# Patient Record
Sex: MALE | Race: WHITE | NOT HISPANIC OR LATINO
[De-identification: names, ages, dates, MRNs, and addresses within clinical notes are randomized per-mention and may not be internally consistent; named-entity substitution may affect disease eponyms.]

---

## 2021-04-01 ENCOUNTER — APPOINTMENT (OUTPATIENT)
Dept: OTOLARYNGOLOGY | Facility: CLINIC | Age: 71
End: 2021-04-01
Payer: MEDICARE

## 2021-04-01 VITALS
WEIGHT: 200 LBS | BODY MASS INDEX: 26.51 KG/M2 | HEIGHT: 73 IN | HEART RATE: 63 BPM | TEMPERATURE: 97.3 F | DIASTOLIC BLOOD PRESSURE: 72 MMHG | OXYGEN SATURATION: 98 % | SYSTOLIC BLOOD PRESSURE: 122 MMHG

## 2021-04-01 DIAGNOSIS — R49.0 DYSPHONIA: ICD-10-CM

## 2021-04-01 PROCEDURE — 31575 DIAGNOSTIC LARYNGOSCOPY: CPT

## 2021-04-01 PROCEDURE — 99204 OFFICE O/P NEW MOD 45 MIN: CPT | Mod: 25

## 2021-04-01 NOTE — HISTORY OF PRESENT ILLNESS
[None] : The patient is currently asymptomatic. [de-identified] : Mr. MELARA is a 70 year male who presents reports that he noticed that his voice raspy and increased phlegm since December, 2020.  He saw Dr. Rg Fry who did a stroboscopy and was found to have a lesion on the left TVF.  He reports that his voice is still relatively the same since December.  Denies any pain, discomfort, shortness of breath, or dysphagia.  Patient is a non-smoker and no sudden weight loss. [Neck Mass] : no neck mass [Difficulty Swallowing] : no difficulty swallowing [Painful Swallowing] : no painful swallowing

## 2021-04-01 NOTE — CONSULT LETTER
[Dear  ___] : Dear  [unfilled], [Consult Letter:] : I had the pleasure of evaluating your patient, [unfilled]. [Please see my note below.] : Please see my note below. [Consult Closing:] : Thank you very much for allowing me to participate in the care of this patient.  If you have any questions, please do not hesitate to contact me. [Sincerely,] : Sincerely, [FreeTextEntry2] : Rg Fry MD ([place], [place]) [FreeTextEntry3] : Ant Denson MD, FACS\par Chief of Otolaryngology [facility]\par  - Dept. of Otolaryngology\par [employer] [employer]\par \par

## 2021-04-01 NOTE — PROCEDURE
[Unable to Cooperate with Mirror] : patient unable to cooperate with mirror [Lesion] : lesion identified by mirror examination needing further evaluation [Topical Lidocaine] : topical lidocaine [Oxymetazoline HCl] : oxymetazoline HCl [Flexible Endoscope] : examined with the flexible endoscope [Serial Number: ___] : Serial Number: [unfilled] [True Vocal Cords Paralysis] : no true vocal cord paralysis [True Vocal Cords Erythematous] : no true vocal cord edema [True Vocal Cords Garces's Nodules] : no true vocal cord nodules [Glottis Arytenoid Cartilages] : no arytenoid granulomas [Glottis Arytenoid Cartilages Erythema] : no arytenoid erythema [Normal] : posterior cricoid area had healthy pink mucosa in the interarytenoid area and the esophageal inlet

## 2021-04-01 NOTE — PHYSICAL EXAM
[FreeTextEntry1] : Raspy voice [Midline] : trachea located in midline position [Laryngoscopy Performed] : laryngoscopy was performed, see procedure section for findings [Normal] : no rashes

## 2021-05-05 ENCOUNTER — OUTPATIENT (OUTPATIENT)
Dept: OUTPATIENT SERVICES | Facility: HOSPITAL | Age: 71
LOS: 1 days | End: 2021-05-05
Payer: MEDICARE

## 2021-05-05 VITALS
SYSTOLIC BLOOD PRESSURE: 133 MMHG | HEIGHT: 72 IN | HEART RATE: 69 BPM | OXYGEN SATURATION: 99 % | RESPIRATION RATE: 16 BRPM | WEIGHT: 207.23 LBS | DIASTOLIC BLOOD PRESSURE: 81 MMHG | TEMPERATURE: 97 F

## 2021-05-05 DIAGNOSIS — J38.3 OTHER DISEASES OF VOCAL CORDS: ICD-10-CM

## 2021-05-05 DIAGNOSIS — Z01.818 ENCOUNTER FOR OTHER PREPROCEDURAL EXAMINATION: ICD-10-CM

## 2021-05-05 LAB
ANION GAP SERPL CALC-SCNC: 11 MMOL/L — SIGNIFICANT CHANGE UP (ref 5–17)
BUN SERPL-MCNC: 19 MG/DL — SIGNIFICANT CHANGE UP (ref 7–23)
CALCIUM SERPL-MCNC: 8.5 MG/DL — SIGNIFICANT CHANGE UP (ref 8.4–10.5)
CHLORIDE SERPL-SCNC: 104 MMOL/L — SIGNIFICANT CHANGE UP (ref 96–108)
CO2 SERPL-SCNC: 25 MMOL/L — SIGNIFICANT CHANGE UP (ref 22–31)
CREAT SERPL-MCNC: 0.89 MG/DL — SIGNIFICANT CHANGE UP (ref 0.5–1.3)
GLUCOSE SERPL-MCNC: 89 MG/DL — SIGNIFICANT CHANGE UP (ref 70–99)
HCT VFR BLD CALC: 47 % — SIGNIFICANT CHANGE UP (ref 39–50)
HGB BLD-MCNC: 16.1 G/DL — SIGNIFICANT CHANGE UP (ref 13–17)
MCHC RBC-ENTMCNC: 30.5 PG — SIGNIFICANT CHANGE UP (ref 27–34)
MCHC RBC-ENTMCNC: 34.3 GM/DL — SIGNIFICANT CHANGE UP (ref 32–36)
MCV RBC AUTO: 89 FL — SIGNIFICANT CHANGE UP (ref 80–100)
NRBC # BLD: 0 /100 WBCS — SIGNIFICANT CHANGE UP (ref 0–0)
PLATELET # BLD AUTO: 179 K/UL — SIGNIFICANT CHANGE UP (ref 150–400)
POTASSIUM SERPL-MCNC: 4.5 MMOL/L — SIGNIFICANT CHANGE UP (ref 3.5–5.3)
POTASSIUM SERPL-SCNC: 4.5 MMOL/L — SIGNIFICANT CHANGE UP (ref 3.5–5.3)
RBC # BLD: 5.28 M/UL — SIGNIFICANT CHANGE UP (ref 4.2–5.8)
RBC # FLD: 12.1 % — SIGNIFICANT CHANGE UP (ref 10.3–14.5)
SODIUM SERPL-SCNC: 140 MMOL/L — SIGNIFICANT CHANGE UP (ref 135–145)
WBC # BLD: 6.04 K/UL — SIGNIFICANT CHANGE UP (ref 3.8–10.5)
WBC # FLD AUTO: 6.04 K/UL — SIGNIFICANT CHANGE UP (ref 3.8–10.5)

## 2021-05-05 PROCEDURE — 85027 COMPLETE CBC AUTOMATED: CPT

## 2021-05-05 PROCEDURE — 93005 ELECTROCARDIOGRAM TRACING: CPT

## 2021-05-05 PROCEDURE — 80048 BASIC METABOLIC PNL TOTAL CA: CPT

## 2021-05-05 PROCEDURE — 36415 COLL VENOUS BLD VENIPUNCTURE: CPT

## 2021-05-05 PROCEDURE — G0463: CPT

## 2021-05-05 PROCEDURE — 93010 ELECTROCARDIOGRAM REPORT: CPT | Mod: NC

## 2021-05-05 RX ORDER — SODIUM CHLORIDE 9 MG/ML
1000 INJECTION, SOLUTION INTRAVENOUS
Refills: 0 | Status: DISCONTINUED | OUTPATIENT
Start: 2021-05-19 | End: 2021-06-03

## 2021-05-05 NOTE — H&P PST ADULT - NSANTHOSAYNRD_GEN_A_CORE
neck 17.5inches/No. ABHIJEET screening performed.  STOP BANG Legend: 0-2 = LOW Risk; 3-4 = INTERMEDIATE Risk; 5-8 = HIGH Risk

## 2021-05-05 NOTE — H&P PST ADULT - NSICDXPROBLEM_GEN_ALL_CORE_FT
PROBLEM DIAGNOSES  Problem: Other diseases of vocal cords  Assessment and Plan: Pre-op instructions given. Pt verbalized understanding  Pending: M/C + Covidprc test/results

## 2021-05-05 NOTE — H&P PST ADULT - HISTORY OF PRESENT ILLNESS
69yo male with medical h/o Vocal cord polyp and presents today for PST for Microlaryngoscopy w/Excision of Vocal Cord Lesion scheduled for 5/19/2021

## 2021-05-16 ENCOUNTER — APPOINTMENT (OUTPATIENT)
Dept: DISASTER EMERGENCY | Facility: CLINIC | Age: 71
End: 2021-05-16

## 2021-05-16 DIAGNOSIS — Z01.818 ENCOUNTER FOR OTHER PREPROCEDURAL EXAMINATION: ICD-10-CM

## 2021-05-16 LAB — SARS-COV-2 N GENE NPH QL NAA+PROBE: NOT DETECTED

## 2021-05-18 ENCOUNTER — TRANSCRIPTION ENCOUNTER (OUTPATIENT)
Age: 71
End: 2021-05-18

## 2021-05-18 PROBLEM — J38.3 OTHER DISEASES OF VOCAL CORDS: Chronic | Status: ACTIVE | Noted: 2021-05-05

## 2021-05-19 ENCOUNTER — RESULT REVIEW (OUTPATIENT)
Age: 71
End: 2021-05-19

## 2021-05-19 ENCOUNTER — APPOINTMENT (OUTPATIENT)
Dept: OTOLARYNGOLOGY | Facility: HOSPITAL | Age: 71
End: 2021-05-19

## 2021-05-19 ENCOUNTER — OUTPATIENT (OUTPATIENT)
Dept: OUTPATIENT SERVICES | Facility: HOSPITAL | Age: 71
LOS: 1 days | End: 2021-05-19
Payer: MEDICARE

## 2021-05-19 VITALS
SYSTOLIC BLOOD PRESSURE: 121 MMHG | HEIGHT: 72 IN | RESPIRATION RATE: 16 BRPM | WEIGHT: 207.23 LBS | TEMPERATURE: 98 F | DIASTOLIC BLOOD PRESSURE: 60 MMHG | HEART RATE: 57 BPM | OXYGEN SATURATION: 98 %

## 2021-05-19 VITALS
HEART RATE: 63 BPM | DIASTOLIC BLOOD PRESSURE: 66 MMHG | TEMPERATURE: 97 F | OXYGEN SATURATION: 99 % | SYSTOLIC BLOOD PRESSURE: 115 MMHG | RESPIRATION RATE: 16 BRPM

## 2021-05-19 DIAGNOSIS — J38.3 OTHER DISEASES OF VOCAL CORDS: ICD-10-CM

## 2021-05-19 DIAGNOSIS — Z98.42 CATARACT EXTRACTION STATUS, LEFT EYE: Chronic | ICD-10-CM

## 2021-05-19 DIAGNOSIS — Z98.890 OTHER SPECIFIED POSTPROCEDURAL STATES: Chronic | ICD-10-CM

## 2021-05-19 DIAGNOSIS — Z98.49 CATARACT EXTRACTION STATUS, UNSPECIFIED EYE: Chronic | ICD-10-CM

## 2021-05-19 PROCEDURE — 88305 TISSUE EXAM BY PATHOLOGIST: CPT | Mod: 26

## 2021-05-19 PROCEDURE — 88305 TISSUE EXAM BY PATHOLOGIST: CPT

## 2021-05-19 PROCEDURE — 31541 LARYNSCOP W/TUMR EXC + SCOPE: CPT | Mod: LT

## 2021-05-19 PROCEDURE — 31541 LARYNSCOP W/TUMR EXC + SCOPE: CPT

## 2021-05-19 RX ORDER — HYDROMORPHONE HYDROCHLORIDE 2 MG/ML
0.5 INJECTION INTRAMUSCULAR; INTRAVENOUS; SUBCUTANEOUS
Refills: 0 | Status: DISCONTINUED | OUTPATIENT
Start: 2021-05-19 | End: 2021-05-19

## 2021-05-19 RX ORDER — SODIUM CHLORIDE 9 MG/ML
1000 INJECTION, SOLUTION INTRAVENOUS
Refills: 0 | Status: DISCONTINUED | OUTPATIENT
Start: 2021-05-19 | End: 2021-05-19

## 2021-05-19 RX ORDER — HYDROMORPHONE HYDROCHLORIDE 2 MG/ML
1 INJECTION INTRAMUSCULAR; INTRAVENOUS; SUBCUTANEOUS
Refills: 0 | Status: DISCONTINUED | OUTPATIENT
Start: 2021-05-19 | End: 2021-05-19

## 2021-05-19 RX ORDER — ONDANSETRON 8 MG/1
4 TABLET, FILM COATED ORAL ONCE
Refills: 0 | Status: DISCONTINUED | OUTPATIENT
Start: 2021-05-19 | End: 2021-05-19

## 2021-05-19 RX ADMIN — SODIUM CHLORIDE 50 MILLILITER(S): 9 INJECTION, SOLUTION INTRAVENOUS at 13:30

## 2021-05-19 NOTE — ASU PATIENT PROFILE, ADULT - PSH
H/O cataract extraction  right eye  History of left cataract extraction    S/P right knee arthroscopy

## 2021-05-19 NOTE — ASU DISCHARGE PLAN (ADULT/PEDIATRIC) - CARE PROVIDER_API CALL
Ant Denson)  Otolaryngology  [address] [place] [address]  Phone: [phone]  Fax: [phone]  Established Patient  Follow Up Time: 1 week

## 2021-05-19 NOTE — ASU DISCHARGE PLAN (ADULT/PEDIATRIC) - NURSING INSTRUCTIONS
increase fluid intake. all discharge instructions discussed with patient and all questions answered. pt verbalized agreement with discharge teaching and follow up instructions

## 2021-05-21 PROBLEM — K64.9 UNSPECIFIED HEMORRHOIDS: Chronic | Status: ACTIVE | Noted: 2021-05-19

## 2021-05-21 PROBLEM — E78.5 HYPERLIPIDEMIA, UNSPECIFIED: Chronic | Status: ACTIVE | Noted: 2021-05-19

## 2021-05-21 PROBLEM — K63.5 POLYP OF COLON: Chronic | Status: ACTIVE | Noted: 2021-05-19

## 2021-05-21 PROBLEM — D68.2 HEREDITARY DEFICIENCY OF OTHER CLOTTING FACTORS: Chronic | Status: ACTIVE | Noted: 2021-05-19

## 2021-05-27 ENCOUNTER — APPOINTMENT (OUTPATIENT)
Dept: OTOLARYNGOLOGY | Facility: CLINIC | Age: 71
End: 2021-05-27
Payer: MEDICARE

## 2021-05-27 VITALS
DIASTOLIC BLOOD PRESSURE: 70 MMHG | WEIGHT: 207 LBS | BODY MASS INDEX: 27.43 KG/M2 | HEIGHT: 73 IN | TEMPERATURE: 97 F | SYSTOLIC BLOOD PRESSURE: 126 MMHG

## 2021-05-27 DIAGNOSIS — Z78.9 OTHER SPECIFIED HEALTH STATUS: ICD-10-CM

## 2021-05-27 PROCEDURE — 99213 OFFICE O/P EST LOW 20 MIN: CPT

## 2021-05-27 NOTE — CONSULT LETTER
[Dear  ___] : Dear  [unfilled], [Courtesy Letter:] : I had the pleasure of seeing your patient, [unfilled], in my office today. [Please see my note below.] : Please see my note below. [Consult Closing:] : Thank you very much for allowing me to participate in the care of this patient.  If you have any questions, please do not hesitate to contact me. [Sincerely,] : Sincerely, [FreeTextEntry2] : Rg Fry MD ([place], [place])\par  [FreeTextEntry3] : Ant Denson MD, FACS\par Chief of Otolaryngology [facility]\par  - Dept. of Otolaryngology\par [facility] [employer]\par \par  [DrKike  ___] : Dr. FERNANDO

## 2021-05-27 NOTE — ASSESSMENT
[FreeTextEntry1] : Pt appears to have a T1 anterior VC lesion.  The excisional biopsy was not an oncologic procedure.  He will need additional treatment.  Given the location on the vocal cord, surgery would result in a worse voice outcome.  Pt to be referred for radiation therapy.

## 2021-05-27 NOTE — CONSULT LETTER
[Dear  ___] : Dear  [unfilled], [Courtesy Letter:] : I had the pleasure of seeing your patient, [unfilled], in my office today. [Please see my note below.] : Please see my note below. [Consult Closing:] : Thank you very much for allowing me to participate in the care of this patient.  If you have any questions, please do not hesitate to contact me. [Sincerely,] : Sincerely, [FreeTextEntry2] : Rg Fry MD ([place], [place])\par  [FreeTextEntry3] : Ant Denson MD, FACS\par Chief of Otolaryngology [facility]\par  - Dept. of Otolaryngology\par [facility] [employer]\par \par  [DrKike  ___] : Dr. FERNANDO

## 2021-05-27 NOTE — REASON FOR VISIT
[Subsequent Evaluation] : a subsequent evaluation for [FreeTextEntry2] : raspy voice s/p direct laryngoscopy and biopsy

## 2021-05-27 NOTE — DATA REVIEWED
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  It  consists of\par multiple fragments of soft, tan tissue ranging from less than 0.1\par cm to 0.4 cm in greatest dimension and aggregating to 0.6 x 0.6 x\par 0.2 cm.  Entirely submitted.  One cassette.\par \par In addition to other data that may appear on the specimen\par container, the label has been inspected to confirm the presence\par of the patient's name and date of birth.\par Grace BARROW 05/20/2021 13:27\par \par Disclaimer\par Histological Processing Performed at [facility], Department of Pathology, [address] [place] [address].\par \par \par Surgical Consult Report\par \par \par \par \par Disclaimer\par Histological Processing Performed at [facility], Department of Pathology, [address] [place] [address] [place].\par \par  \par \par  Ordered by: SHELLEY SANCHEZ       Collected/Examined: 19May2021 07:14AM       \par Verified by: SHELLEY SANCHEZ 27May2021 09:29AM       \par  Result Communication: No patient communication needed at this time;\par Stage: Final       \par  Performed at: [facility]       Resulted: 21May2021 01:24PM       Last Updated: 27May2021 09:29AM       Accession: [id number]

## 2021-05-27 NOTE — HISTORY OF PRESENT ILLNESS
[None] : The patient is currently asymptomatic. [de-identified] : Mr. MELARA is a 70 year male who presents reports that he noticed that his voice raspy and increased phlegm since December, 2020.  He saw Dr. Rg Fry who did a stroboscopy and was found to have a lesion on the left TVF.  He reports that his voice is still relatively the same since December.  Denies any pain, discomfort, shortness of breath, or dysphagia.  Patient is a non-smoker and no sudden weight loss. [FreeTextEntry1] : Mr. Salas is over 1 week post direct laryngoscopy and biopsy of a vocal cord lesion.  Final pathology is consistent with invasive SCCa, well to moderately differentiated.  He reports that his voice has improved since the procedure. [Neck Mass] : no neck mass [Difficulty Swallowing] : no difficulty swallowing [Painful Swallowing] : no painful swallowing

## 2021-05-27 NOTE — HISTORY OF PRESENT ILLNESS
[None] : The patient is currently asymptomatic. [de-identified] : Mr. MELARA is a 70 year male who presents reports that he noticed that his voice raspy and increased phlegm since December, 2020.  He saw Dr. Rg Fry who did a stroboscopy and was found to have a lesion on the left TVF.  He reports that his voice is still relatively the same since December.  Denies any pain, discomfort, shortness of breath, or dysphagia.  Patient is a non-smoker and no sudden weight loss. [FreeTextEntry1] : Mr. Salas is over 1 week post direct laryngoscopy and biopsy of a vocal cord lesion.  Final pathology is consistent with invasive SCCa, well to moderately differentiated.  He reports that his voice has improved since the procedure. [Neck Mass] : no neck mass [Difficulty Swallowing] : no difficulty swallowing [Painful Swallowing] : no painful swallowing

## 2021-05-27 NOTE — DATA REVIEWED
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  It  consists of\par multiple fragments of soft, tan tissue ranging from less than 0.1\par cm to 0.4 cm in greatest dimension and aggregating to 0.6 x 0.6 x\par 0.2 cm.  Entirely submitted.  One cassette.\par \par In addition to other data that may appear on the specimen\par container, the label has been inspected to confirm the presence\par of the patient's name and date of birth.\par Grace BARROW 05/20/2021 13:27\par \par Disclaimer\par Histological Processing Performed at [facility], Department of Pathology, [address] [place] [address].\par \par \par Surgical Consult Report\par \par \par \par \par Disclaimer\par Histological Processing Performed at [facility], Department of Pathology, [address] [place] [address] [place].\par \par  \par \par  Ordered by: SHELLEY SANCHEZ       Collected/Examined: 19May2021 07:14AM       \par Verified by: SHELLEY SANCHEZ 27May2021 09:29AM       \par  Result Communication: No patient communication needed at this time;\par Stage: Final       \par  Performed at: [facility]       Resulted: 21May2021 01:24PM       Last Updated: 27May2021 09:29AM       Accession: [id number]

## 2021-06-08 ENCOUNTER — APPOINTMENT (OUTPATIENT)
Dept: RADIATION ONCOLOGY | Facility: CLINIC | Age: 71
End: 2021-06-08
Payer: MEDICARE

## 2021-06-08 ENCOUNTER — RESULT REVIEW (OUTPATIENT)
Age: 71
End: 2021-06-08

## 2021-06-08 ENCOUNTER — OUTPATIENT (OUTPATIENT)
Dept: OUTPATIENT SERVICES | Facility: HOSPITAL | Age: 71
LOS: 1 days | Discharge: HOME | End: 2021-06-08
Payer: MEDICARE

## 2021-06-08 VITALS
SYSTOLIC BLOOD PRESSURE: 128 MMHG | BODY MASS INDEX: 27.21 KG/M2 | TEMPERATURE: 97.2 F | HEART RATE: 66 BPM | DIASTOLIC BLOOD PRESSURE: 79 MMHG | RESPIRATION RATE: 18 BRPM | OXYGEN SATURATION: 99 % | WEIGHT: 206.24 LBS

## 2021-06-08 DIAGNOSIS — Z98.49 CATARACT EXTRACTION STATUS, UNSPECIFIED EYE: Chronic | ICD-10-CM

## 2021-06-08 DIAGNOSIS — Z98.42 CATARACT EXTRACTION STATUS, LEFT EYE: Chronic | ICD-10-CM

## 2021-06-08 DIAGNOSIS — Z98.890 OTHER SPECIFIED POSTPROCEDURAL STATES: Chronic | ICD-10-CM

## 2021-06-08 PROCEDURE — 31575 DIAGNOSTIC LARYNGOSCOPY: CPT

## 2021-06-08 PROCEDURE — 99204 OFFICE O/P NEW MOD 45 MIN: CPT | Mod: 25

## 2021-06-08 PROCEDURE — 77263 THER RADIOLOGY TX PLNG CPLX: CPT

## 2021-06-09 ENCOUNTER — NON-APPOINTMENT (OUTPATIENT)
Age: 71
End: 2021-06-09

## 2021-06-10 ENCOUNTER — APPOINTMENT (OUTPATIENT)
Dept: CT IMAGING | Facility: IMAGING CENTER | Age: 71
End: 2021-06-10
Payer: MEDICARE

## 2021-06-10 ENCOUNTER — OUTPATIENT (OUTPATIENT)
Dept: OUTPATIENT SERVICES | Facility: HOSPITAL | Age: 71
LOS: 1 days | End: 2021-06-10
Payer: MEDICARE

## 2021-06-10 DIAGNOSIS — Z98.890 OTHER SPECIFIED POSTPROCEDURAL STATES: Chronic | ICD-10-CM

## 2021-06-10 DIAGNOSIS — Z98.49 CATARACT EXTRACTION STATUS, UNSPECIFIED EYE: Chronic | ICD-10-CM

## 2021-06-10 DIAGNOSIS — C32.0 MALIGNANT NEOPLASM OF GLOTTIS: ICD-10-CM

## 2021-06-10 DIAGNOSIS — Z98.42 CATARACT EXTRACTION STATUS, LEFT EYE: Chronic | ICD-10-CM

## 2021-06-10 PROCEDURE — 70491 CT SOFT TISSUE NECK W/DYE: CPT

## 2021-06-10 PROCEDURE — 70491 CT SOFT TISSUE NECK W/DYE: CPT | Mod: 26,MH

## 2021-06-10 PROCEDURE — 71260 CT THORAX DX C+: CPT | Mod: 26,MH

## 2021-06-10 PROCEDURE — 71260 CT THORAX DX C+: CPT

## 2021-06-10 NOTE — PHYSICAL EXAM
[Normal] : oriented to person, place and time, the affect was normal, the mood was normal and not anxious [FreeTextEntry1] : Oral cavity, no mass. free mobile tongue. no cervical lymphadenopathy

## 2021-06-10 NOTE — PROCEDURE
[Hoarseness] : hoarseness not clearly evaluated by indirect laryngoscopy [Lesion] : lesion identified by mirror examination needing further evaluation [Topical Lidocaine] : topical lidocaine [Normal] : normal pharyngeal walls [Abnormal] : the true vocal cords ~T were abnormal [de-identified] :  left cord lesion  , free mobile cords

## 2021-06-10 NOTE — HISTORY OF PRESENT ILLNESS
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 \par He noticed that his voice was raspy and he had increased phlegm since December, 2020. He saw Dr. Rg Fry who did a stroboscopy and was found to have a lesion on the left TVF.\par 5/19/2021 He had direct laryngoscopy and biopsy of a vocal cord lesion with Dr. Denson. Final pathology consistent with invasive squamous cell carcinoma well to moderately differentiated. \par \par Presents for consultation to discuss treatment with radiation therapy. Patient denies hoarseness, difficulty with swallowing, pain, and coughing. He report clear phlegm in the morning that clears up during the day, and occasional dry mouth.

## 2021-06-10 NOTE — REVIEW OF SYSTEMS
[Negative] : Endocrine [Fatigue: Grade 0] : Fatigue: Grade 0 [Xerostomia: Grade 0] : Xerostomia: Grade 0 [Oral Pain: Grade 0] : Oral Pain: Grade 0 [Salivary duct inflammation: Grade 0] : Salivary duct inflammation: Grade 0 [Dysgeusia: Grade 0] : Dysgeusia: Grade 0 [Dysphagia] : no dysphagia [Hoarseness] : no hoarseness [Mucosal Pain] : no mucosal pain

## 2021-06-16 PROCEDURE — 77300 RADIATION THERAPY DOSE PLAN: CPT | Mod: 26

## 2021-06-16 PROCEDURE — 77301 RADIOTHERAPY DOSE PLAN IMRT: CPT | Mod: 26

## 2021-06-16 PROCEDURE — 77338 DESIGN MLC DEVICE FOR IMRT: CPT | Mod: 26

## 2021-06-24 PROCEDURE — 77427 RADIATION TX MANAGEMENT X5: CPT

## 2021-06-24 PROCEDURE — 77387B: CUSTOM | Mod: 26

## 2021-06-25 PROCEDURE — 77387B: CUSTOM | Mod: 26

## 2021-06-29 PROCEDURE — 77387B: CUSTOM | Mod: 26

## 2021-06-30 ENCOUNTER — NON-APPOINTMENT (OUTPATIENT)
Age: 71
End: 2021-06-30

## 2021-06-30 VITALS
RESPIRATION RATE: 16 BRPM | HEART RATE: 62 BPM | WEIGHT: 207.68 LBS | DIASTOLIC BLOOD PRESSURE: 84 MMHG | OXYGEN SATURATION: 99 % | SYSTOLIC BLOOD PRESSURE: 123 MMHG | BODY MASS INDEX: 27.4 KG/M2

## 2021-06-30 PROCEDURE — 77387B: CUSTOM | Mod: 26

## 2021-06-30 NOTE — HISTORY OF PRESENT ILLNESS
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 \par He noticed that his voice was raspy and he had increased phlegm since December, 2020. He saw Dr. Rg Fry who did a stroboscopy and was found to have a lesion on the left TVF.\par 5/19/2021 He had direct laryngoscopy and biopsy of a vocal cord lesion with Dr. Denson. Final pathology consistent with invasive squamous cell carcinoma well to moderately differentiated. \par \par He presents for post treatment evaluation. Completed 4/28 fractions to Larynx. Tolerating treatment well. Denies pain, difficulty swallowing, states eating with no problem. Skin and mouth care reviewed.

## 2021-06-30 NOTE — DISEASE MANAGEMENT
[Clinical] : TNM Stage: c [I] : I [TTNM] : 1 [NTNM] : 0 [MTNM] : 0 [de-identified] : 900 [[place] [address]] : 7389 [de-identified] : Larynx

## 2021-07-01 PROCEDURE — 77014: CPT | Mod: 26

## 2021-07-02 PROCEDURE — 77387B: CUSTOM | Mod: 26

## 2021-07-02 PROCEDURE — 77427 RADIATION TX MANAGEMENT X5: CPT

## 2021-07-06 PROCEDURE — 77387B: CUSTOM | Mod: 26

## 2021-07-07 ENCOUNTER — NON-APPOINTMENT (OUTPATIENT)
Age: 71
End: 2021-07-07

## 2021-07-07 VITALS
RESPIRATION RATE: 16 BRPM | WEIGHT: 208.11 LBS | SYSTOLIC BLOOD PRESSURE: 123 MMHG | OXYGEN SATURATION: 97 % | HEART RATE: 67 BPM | BODY MASS INDEX: 27.46 KG/M2 | DIASTOLIC BLOOD PRESSURE: 77 MMHG

## 2021-07-07 PROCEDURE — 77387B: CUSTOM | Mod: 26

## 2021-07-07 NOTE — DISEASE MANAGEMENT
[Clinical] : TNM Stage: c [I] : I [TTNM] : 1 [NTNM] : 0 [MTNM] : 0 [de-identified] : 7550 [[place] [address]] : 4394 [de-identified] : Larynx

## 2021-07-07 NOTE — HISTORY OF PRESENT ILLNESS
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 \par He noticed that his voice was raspy and he had increased phlegm since December, 2020. He saw Dr. Rg Fry who did a stroboscopy and was found to have a lesion on the left TVF.\par 5/19/2021 He had direct laryngoscopy and biopsy of a vocal cord lesion with Dr. Denson. Final pathology consistent with invasive squamous cell carcinoma well to moderately differentiated. \par \par He presents for post treatment evaluation. Completed 8/28 fractions to Larynx. Tolerating treatment well. Denies pain, difficulty swallowing, states eating with no problem. Skin and mouth care reviewed.

## 2021-07-07 NOTE — REVIEW OF SYSTEMS
[Dysphagia: Grade 0] : Dysphagia: Grade 0 [Mucositis Oral: Grade 0] : Mucositis Oral: Grade 0  [Xerostomia: Grade 0] : Xerostomia: Grade 0 [Oral Pain: Grade 0] : Oral Pain: Grade 0 [Dysgeusia: Grade 0] : Dysgeusia: Grade 0 [Skin Hyperpigmentation: Grade 0] : Skin Hyperpigmentation: Grade 0 [Dermatitis Radiation: Grade 0] : Dermatitis Radiation: Grade 0 [Fatigue: Grade 1 - Fatigue relieved by rest] : Fatigue: Grade 1 - Fatigue relieved by rest

## 2021-07-08 PROCEDURE — 77014: CPT | Mod: 26

## 2021-07-09 PROCEDURE — 77387B: CUSTOM | Mod: 26

## 2021-07-12 ENCOUNTER — NON-APPOINTMENT (OUTPATIENT)
Age: 71
End: 2021-07-12

## 2021-07-12 VITALS
WEIGHT: 206.24 LBS | OXYGEN SATURATION: 97 % | BODY MASS INDEX: 27.21 KG/M2 | RESPIRATION RATE: 16 BRPM | SYSTOLIC BLOOD PRESSURE: 118 MMHG | HEART RATE: 70 BPM | DIASTOLIC BLOOD PRESSURE: 74 MMHG

## 2021-07-12 PROCEDURE — 77387B: CUSTOM | Mod: 26

## 2021-07-12 PROCEDURE — 77427 RADIATION TX MANAGEMENT X5: CPT

## 2021-07-12 NOTE — VITALS
[Maximal Pain Intensity: 8/10] : 8/10 [Least Pain Intensity: 0/10] : 0/10 [Pain Description/Quality: ___] : Pain description/quality: [unfilled] [Pain Duration: ___] : Pain duration: [unfilled] [Pain Location: ___] : Pain Location: [unfilled] [Pain Interferes with ADLs] : Pain interferes with activities of daily living. [90: Able to carry normal activity; minor signs or symptoms of disease.] : 90: Able to carry normal activity; minor signs or symptoms of disease.  [ECOG Performance Status: 1 - Restricted in physically strenuous activity but ambulatory and able to carry out work of a light or sedentary nature] : Performance Status: 1 - Restricted in physically strenuous activity but ambulatory and able to carry out work of a light or sedentary nature, e.g., light house work, office work

## 2021-07-12 NOTE — DISEASE MANAGEMENT
[Clinical] : TNM Stage: c [I] : I [TTNM] : 1 [NTNM] : 0 [MTNM] : 0 [de-identified] : 7927 [[place] [address]] : 5969 [de-identified] : Larynx

## 2021-07-12 NOTE — REVIEW OF SYSTEMS
[Dysphagia: Grade 1 - Symptomatic, able to eat regular diet] : Dysphagia: Grade 1 - Symptomatic, able to eat regular diet [Fatigue: Grade 1 - Fatigue relieved by rest] : Fatigue: Grade 1 - Fatigue relieved by rest [Mucositis Oral: Grade 0] : Mucositis Oral: Grade 0  [Xerostomia: Grade 1 - Symptomatic (e.g., dry or thick saliva) without significant dietary alteration; unstimulated saliva flow >0.2 ml/min] : Xerostomia: Grade 1 - Symptomatic (e.g., dry or thick saliva) without significant dietary alteration; unstimulated saliva flow >0.2 ml/min [Oral Pain: Grade 0] : Oral Pain: Grade 0 [Dysgeusia: Grade 0] : Dysgeusia: Grade 0 [Skin Hyperpigmentation: Grade 1 - Hyperpigmentation covering <10% BSA; no psychosocial impact] : Skin Hyperpigmentation: Grade 1 - Hyperpigmentation covering <10% BSA; no psychosocial impact [Dermatitis Radiation: Grade 1 - Faint erythema or dry desquamation] : Dermatitis Radiation: Grade 1 - Faint erythema or dry desquamation

## 2021-07-12 NOTE — HISTORY OF PRESENT ILLNESS
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 \par He noticed that his voice was raspy and he had increased phlegm since December, 2020. He saw Dr. Rg Fry who did a stroboscopy and was found to have a lesion on the left TVF.\par 5/19/2021 He had direct laryngoscopy and biopsy of a vocal cord lesion with Dr. Denson. Final pathology consistent with invasive squamous cell carcinoma well to moderately differentiated. \par \par He presents for post treatment evaluation. Completed 11/28 fractions to Larynx. Starting to have pain to throat and  difficulty swallowing, 2 lb weight loss noted. Magic mouthwash e-scribed.

## 2021-07-13 PROCEDURE — 77387B: CUSTOM | Mod: 26

## 2021-07-14 PROCEDURE — 77387B: CUSTOM | Mod: 26

## 2021-07-15 PROCEDURE — 77014: CPT | Mod: 26

## 2021-07-16 PROCEDURE — 77387B: CUSTOM | Mod: 26

## 2021-07-19 PROCEDURE — 77427 RADIATION TX MANAGEMENT X5: CPT

## 2021-07-19 PROCEDURE — 77387B: CUSTOM | Mod: 26

## 2021-07-20 PROCEDURE — 77387B: CUSTOM | Mod: 26

## 2021-07-21 VITALS
SYSTOLIC BLOOD PRESSURE: 125 MMHG | TEMPERATURE: 97 F | WEIGHT: 205.36 LBS | BODY MASS INDEX: 27.09 KG/M2 | OXYGEN SATURATION: 96 % | RESPIRATION RATE: 17 BRPM | HEART RATE: 70 BPM | DIASTOLIC BLOOD PRESSURE: 78 MMHG

## 2021-07-21 PROCEDURE — 77387B: CUSTOM | Mod: 26

## 2021-07-21 NOTE — DISEASE MANAGEMENT
[Clinical] : TNM Stage: c [I] : I [TTNM] : 1 [NTNM] : 0 [MTNM] : 0 [de-identified] : 8003 [[place] [address]] : 9983 [de-identified] : Larynx

## 2021-07-21 NOTE — HISTORY OF PRESENT ILLNESS
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 \par He noticed that his voice was raspy and he had increased phlegm since December, 2020. He saw Dr. Rg Fry who did a stroboscopy and was found to have a lesion on the left TVF.\par 5/19/2021 He had direct laryngoscopy and biopsy of a vocal cord lesion with Dr. Denson. Final pathology consistent with invasive squamous cell carcinoma well to moderately differentiated. \par \par He presents for post treatment evaluation. Completed 18/28 fractions to Larynx. Continues to have pain to throat and  difficulty swallowing,  1 weight loss noted. Using Magic mouthwash with some relief.

## 2021-07-22 PROCEDURE — 77014: CPT | Mod: 26

## 2021-07-23 PROCEDURE — 77387B: CUSTOM | Mod: 26

## 2021-07-26 PROCEDURE — 77427 RADIATION TX MANAGEMENT X5: CPT

## 2021-07-26 PROCEDURE — 77387B: CUSTOM | Mod: 26

## 2021-07-27 PROCEDURE — 77387B: CUSTOM | Mod: 26

## 2021-07-28 ENCOUNTER — NON-APPOINTMENT (OUTPATIENT)
Age: 71
End: 2021-07-28

## 2021-07-28 VITALS
WEIGHT: 203.26 LBS | TEMPERATURE: 95.9 F | HEART RATE: 70 BPM | OXYGEN SATURATION: 97 % | SYSTOLIC BLOOD PRESSURE: 109 MMHG | DIASTOLIC BLOOD PRESSURE: 64 MMHG | RESPIRATION RATE: 17 BRPM | BODY MASS INDEX: 26.82 KG/M2

## 2021-07-28 PROCEDURE — 77387B: CUSTOM | Mod: 26

## 2021-07-28 NOTE — HISTORY OF PRESENT ILLNESS
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 \par He noticed that his voice was raspy and he had increased phlegm since December, 2020. He saw Dr. Rg Fry who did a stroboscopy and was found to have a lesion on the left TVF.\par 5/19/2021 He had direct laryngoscopy and biopsy of a vocal cord lesion with Dr. Denson. Final pathology consistent with invasive squamous cell carcinoma well to moderately differentiated. \par \par He presents for post treatment evaluation. Completed 23/28 fractions to Larynx. Continues to have pain to throat and  difficulty swallowing,  2 weight loss noted. Using Magic mouthwash with some relief.

## 2021-07-28 NOTE — DISEASE MANAGEMENT
[Clinical] : TNM Stage: c [I] : I [TTNM] : 1 [MTNM] : 0 [NTNM] : 0 [de-identified] : 1518 [[place] [address]] : 0156 [de-identified] : Larynx

## 2021-07-29 PROCEDURE — 77014: CPT | Mod: 26

## 2021-07-30 PROCEDURE — 77387B: CUSTOM | Mod: 26

## 2021-08-02 PROCEDURE — 77427 RADIATION TX MANAGEMENT X5: CPT

## 2021-08-02 PROCEDURE — 77387B: CUSTOM | Mod: 26

## 2021-08-03 PROCEDURE — 77387B: CUSTOM | Mod: 26

## 2021-08-04 VITALS
OXYGEN SATURATION: 97 % | BODY MASS INDEX: 26.67 KG/M2 | TEMPERATURE: 92.4 F | SYSTOLIC BLOOD PRESSURE: 110 MMHG | HEART RATE: 68 BPM | WEIGHT: 202.16 LBS | RESPIRATION RATE: 18 BRPM | DIASTOLIC BLOOD PRESSURE: 74 MMHG

## 2021-08-04 PROCEDURE — 77387B: CUSTOM | Mod: 26

## 2021-08-04 NOTE — REVIEW OF SYSTEMS
[Constipation: Grade 0] : Constipation: Grade 0 [Diarrhea: Grade 0] : Diarrhea: Grade 0 [Dysphagia: Grade 1 - Symptomatic, able to eat regular diet] : Dysphagia: Grade 1 - Symptomatic, able to eat regular diet [Nausea: Grade 0] : Nausea: Grade 0 [Vomiting: Grade 0] : Vomiting: Grade 0 [Fatigue: Grade 1 - Fatigue relieved by rest] : Fatigue: Grade 1 - Fatigue relieved by rest [Mucositis Oral: Grade 0] : Mucositis Oral: Grade 0  [Xerostomia: Grade 1 - Symptomatic (e.g., dry or thick saliva) without significant dietary alteration; unstimulated saliva flow >0.2 ml/min] : Xerostomia: Grade 1 - Symptomatic (e.g., dry or thick saliva) without significant dietary alteration; unstimulated saliva flow >0.2 ml/min [Oral Pain: Grade 0] : Oral Pain: Grade 0 [Dysgeusia: Grade 0] : Dysgeusia: Grade 0 [Skin Hyperpigmentation: Grade 1 - Hyperpigmentation covering <10% BSA; no psychosocial impact] : Skin Hyperpigmentation: Grade 1 - Hyperpigmentation covering <10% BSA; no psychosocial impact [Dermatitis Radiation: Grade 1 - Faint erythema or dry desquamation] : Dermatitis Radiation: Grade 1 - Faint erythema or dry desquamation

## 2021-08-04 NOTE — VITALS
[Maximal Pain Intensity: 8/10] : 8/10 [Least Pain Intensity: 5/10] : 5/10 [Pain Description/Quality: ___] : Pain description/quality: [unfilled] [Pain Duration: ___] : Pain duration: [unfilled] [Pain Location: ___] : Pain Location: [unfilled] [Pain Interferes with ADLs] : Pain interferes with activities of daily living. [NSAID/Non-Opioid] : NSAID/Non-Opioid [80: Normal activity with effort; some signs or symptoms of disease.] : 80: Normal activity with effort; some signs or symptoms of disease.  [ECOG Performance Status: 0 - Fully active, able to carry on all pre-disease performance without restriction] : Performance Status: 0 - Fully active, able to carry on all pre-disease performance without restriction

## 2021-08-04 NOTE — HISTORY OF PRESENT ILLNESS
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 \par He noticed that his voice was raspy and he had increased phlegm since December, 2020. He saw Dr. Rg Fry who did a stroboscopy and was found to have a lesion on the left TVF.\par 5/19/2021 He had direct laryngoscopy and biopsy of a vocal cord lesion with Dr. Denson. Final pathology consistent with invasive squamous cell carcinoma well to moderately differentiated. \par \par He presents for post treatment evaluation. Completed 23/28 fractions to Larynx. Continues to have pain to throat and  difficulty swallowing,  2 weight loss noted. Using Magic mouthwash with some relief. \par \par 8/4/21\par -Tolerated tx and completed today\par -Voice very hoarse and difficult to eat. Pain upon eating 8/10\par -mild erythema noted-using aquaphor to neck\par -Has lost some wt\par -completed tx today and given discharge packet.\par -To use carlos for next 2 weeks

## 2021-08-04 NOTE — DISEASE MANAGEMENT
[Clinical] : TNM Stage: c [TTNM] : 1 [NTNM] : 0 [MTNM] : 0 [I] : I [de-identified] : 28 tx/6300cGy [[place] [address]] : 3328 [de-identified] : Larynx

## 2021-08-30 ENCOUNTER — APPOINTMENT (OUTPATIENT)
Dept: OTOLARYNGOLOGY | Facility: CLINIC | Age: 71
End: 2021-08-30

## 2021-09-15 ENCOUNTER — APPOINTMENT (OUTPATIENT)
Dept: RADIATION ONCOLOGY | Facility: CLINIC | Age: 71
End: 2021-09-15
Payer: MEDICARE

## 2021-09-15 VITALS
TEMPERATURE: 98.5 F | WEIGHT: 207.45 LBS | OXYGEN SATURATION: 96 % | HEART RATE: 64 BPM | SYSTOLIC BLOOD PRESSURE: 133 MMHG | DIASTOLIC BLOOD PRESSURE: 77 MMHG | RESPIRATION RATE: 15 BRPM | BODY MASS INDEX: 27.49 KG/M2 | HEIGHT: 73 IN

## 2021-09-15 PROCEDURE — 99024 POSTOP FOLLOW-UP VISIT: CPT

## 2021-09-15 RX ORDER — DIPHENHYDRAMINE HYDROCHLORIDE AND LIDOCAINE HYDROCHLORIDE AND ALUMINUM HYDROXIDE AND MAGNESIUM HYDRO
KIT
Qty: 300 | Refills: 3 | Status: DISCONTINUED | COMMUNITY
Start: 2021-07-12 | End: 2021-09-15

## 2021-09-15 NOTE — REVIEW OF SYSTEMS
[Constipation: Grade 0] : Constipation: Grade 0 [Diarrhea: Grade 0] : Diarrhea: Grade 0 [Nausea: Grade 0] : Nausea: Grade 0 [Vomiting: Grade 0] : Vomiting: Grade 0 [Fatigue: Grade 1 - Fatigue relieved by rest] : Fatigue: Grade 1 - Fatigue relieved by rest [Mucositis Oral: Grade 0] : Mucositis Oral: Grade 0  [Oral Pain: Grade 0] : Oral Pain: Grade 0 [Xerostomia: Grade 1 - Symptomatic (e.g., dry or thick saliva) without significant dietary alteration; unstimulated saliva flow >0.2 ml/min] : Xerostomia: Grade 1 - Symptomatic (e.g., dry or thick saliva) without significant dietary alteration; unstimulated saliva flow >0.2 ml/min [Dysgeusia: Grade 0] : Dysgeusia: Grade 0 [Skin Hyperpigmentation: Grade 1 - Hyperpigmentation covering <10% BSA; no psychosocial impact] : Skin Hyperpigmentation: Grade 1 - Hyperpigmentation covering <10% BSA; no psychosocial impact [Dysphagia: Grade 0] : Dysphagia: Grade 0 [Dermatitis Radiation: Grade 0] : Dermatitis Radiation: Grade 0

## 2021-09-16 NOTE — DISEASE MANAGEMENT
[Clinical] : TNM Stage: c [I] : I [TTNM] : 1 [NTNM] : 0 [MTNM] : 0 [de-identified] : 28 tx/6300cGy [[place] [address]] : 4888 [de-identified] : Larynx

## 2021-09-16 NOTE — VITALS
[80: Normal activity with effort; some signs or symptoms of disease.] : 80: Normal activity with effort; some signs or symptoms of disease.  [ECOG Performance Status: 0 - Fully active, able to carry on all pre-disease performance without restriction] : Performance Status: 0 - Fully active, able to carry on all pre-disease performance without restriction [Maximal Pain Intensity: 0/10] : 0/10 [Least Pain Intensity: 0/10] : 0/10 [Pain Interferes with ADLs] : Pain does not interfere with activities of daily living

## 2021-09-27 ENCOUNTER — APPOINTMENT (OUTPATIENT)
Dept: OTOLARYNGOLOGY | Facility: CLINIC | Age: 71
End: 2021-09-27
Payer: MEDICARE

## 2021-09-27 VITALS
DIASTOLIC BLOOD PRESSURE: 78 MMHG | WEIGHT: 200 LBS | SYSTOLIC BLOOD PRESSURE: 124 MMHG | TEMPERATURE: 98.2 F | HEART RATE: 69 BPM | BODY MASS INDEX: 26.51 KG/M2 | OXYGEN SATURATION: 98 % | HEIGHT: 73 IN

## 2021-09-27 PROCEDURE — 99214 OFFICE O/P EST MOD 30 MIN: CPT | Mod: 25

## 2021-09-27 PROCEDURE — 31575 DIAGNOSTIC LARYNGOSCOPY: CPT

## 2021-09-27 NOTE — ASSESSMENT
[FreeTextEntry1] : Pt is SUZI.  he will f/u in 6 weeks for a recheck.  Risk factors for Lx CA d/w pt again, including tobacco use and alcohol ingestion.  Pt expressed understanding.  He is a nonsmoker and does not drink heavily.

## 2021-09-27 NOTE — HISTORY OF PRESENT ILLNESS
[de-identified] : Mr. MELARA is a 71 year male who presents with vocal cord invasive SCCa, stage T1N0M0 s/p RT with Dr. Patterson on August 4, 2020.  He is otherwise doing well and is without any complaints and notes that he is tolerating a regular diet.  Denies any pain or discomfort. [Neck Mass] : no neck mass [Difficulty Swallowing] : no difficulty swallowing [Painful Swallowing] : no painful swallowing

## 2021-09-27 NOTE — CONSULT LETTER
[Dear  ___] : Dear  [unfilled], [Courtesy Letter:] : I had the pleasure of seeing your patient, [unfilled], in my office today. [Please see my note below.] : Please see my note below. [Consult Closing:] : Thank you very much for allowing me to participate in the care of this patient.  If you have any questions, please do not hesitate to contact me. [Sincerely,] : Sincerely, [FreeTextEntry2] : Rg Fry MD ([place], [place])\par  [FreeTextEntry3] : Ant Denson MD, FACS\par Chief of Otolaryngology [facility]\par  - Dept. of Otolaryngology\par [employer] [facility] [employer]\par \par  [DrKike  ___] : Dr. FERNANDO

## 2021-09-27 NOTE — PHYSICAL EXAM
[FreeTextEntry1] : Normal voice [Midline] : trachea located in midline position [Laryngoscopy Performed] : laryngoscopy was performed, see procedure section for findings [Normal] : no rashes

## 2021-10-03 ENCOUNTER — INPATIENT (INPATIENT)
Facility: HOSPITAL | Age: 71
LOS: 1 days | Discharge: ANOTHER HOSPITAL | DRG: 309 | End: 2021-10-05
Attending: HOSPITALIST | Admitting: HOSPITALIST
Payer: MEDICARE

## 2021-10-03 VITALS
SYSTOLIC BLOOD PRESSURE: 112 MMHG | WEIGHT: 199.96 LBS | RESPIRATION RATE: 18 BRPM | HEIGHT: 72 IN | OXYGEN SATURATION: 98 % | TEMPERATURE: 98 F | DIASTOLIC BLOOD PRESSURE: 84 MMHG | HEART RATE: 114 BPM

## 2021-10-03 DIAGNOSIS — Z98.42 CATARACT EXTRACTION STATUS, LEFT EYE: Chronic | ICD-10-CM

## 2021-10-03 DIAGNOSIS — I48.91 UNSPECIFIED ATRIAL FIBRILLATION: ICD-10-CM

## 2021-10-03 DIAGNOSIS — Z98.49 CATARACT EXTRACTION STATUS, UNSPECIFIED EYE: Chronic | ICD-10-CM

## 2021-10-03 DIAGNOSIS — Z98.890 OTHER SPECIFIED POSTPROCEDURAL STATES: Chronic | ICD-10-CM

## 2021-10-03 LAB
ANION GAP SERPL CALC-SCNC: 6 MMOL/L — SIGNIFICANT CHANGE UP (ref 5–17)
BASOPHILS # BLD AUTO: 0.05 K/UL — SIGNIFICANT CHANGE UP (ref 0–0.2)
BASOPHILS NFR BLD AUTO: 0.8 % — SIGNIFICANT CHANGE UP (ref 0–2)
BUN SERPL-MCNC: 25 MG/DL — HIGH (ref 7–23)
CALCIUM SERPL-MCNC: 8.4 MG/DL — SIGNIFICANT CHANGE UP (ref 8.4–10.5)
CHLORIDE SERPL-SCNC: 106 MMOL/L — SIGNIFICANT CHANGE UP (ref 96–108)
CO2 SERPL-SCNC: 26 MMOL/L — SIGNIFICANT CHANGE UP (ref 22–31)
CREAT SERPL-MCNC: 0.91 MG/DL — SIGNIFICANT CHANGE UP (ref 0.5–1.3)
D DIMER BLD IA.RAPID-MCNC: <150 NG/ML DDU — SIGNIFICANT CHANGE UP
EOSINOPHIL # BLD AUTO: 0.13 K/UL — SIGNIFICANT CHANGE UP (ref 0–0.5)
EOSINOPHIL NFR BLD AUTO: 2 % — SIGNIFICANT CHANGE UP (ref 0–6)
GLUCOSE SERPL-MCNC: 122 MG/DL — HIGH (ref 70–99)
HCT VFR BLD CALC: 48 % — SIGNIFICANT CHANGE UP (ref 39–50)
HGB BLD-MCNC: 16.6 G/DL — SIGNIFICANT CHANGE UP (ref 13–17)
IMM GRANULOCYTES NFR BLD AUTO: 0.2 % — SIGNIFICANT CHANGE UP (ref 0–1.5)
LYMPHOCYTES # BLD AUTO: 2.6 K/UL — SIGNIFICANT CHANGE UP (ref 1–3.3)
LYMPHOCYTES # BLD AUTO: 39.5 % — SIGNIFICANT CHANGE UP (ref 13–44)
MAGNESIUM SERPL-MCNC: 2.1 MG/DL — SIGNIFICANT CHANGE UP (ref 1.6–2.6)
MCHC RBC-ENTMCNC: 31.1 PG — SIGNIFICANT CHANGE UP (ref 27–34)
MCHC RBC-ENTMCNC: 34.6 GM/DL — SIGNIFICANT CHANGE UP (ref 32–36)
MCV RBC AUTO: 90.1 FL — SIGNIFICANT CHANGE UP (ref 80–100)
MONOCYTES # BLD AUTO: 0.66 K/UL — SIGNIFICANT CHANGE UP (ref 0–0.9)
MONOCYTES NFR BLD AUTO: 10 % — SIGNIFICANT CHANGE UP (ref 2–14)
NEUTROPHILS # BLD AUTO: 3.14 K/UL — SIGNIFICANT CHANGE UP (ref 1.8–7.4)
NEUTROPHILS NFR BLD AUTO: 47.5 % — SIGNIFICANT CHANGE UP (ref 43–77)
NRBC # BLD: 0 /100 WBCS — SIGNIFICANT CHANGE UP (ref 0–0)
PLATELET # BLD AUTO: 172 K/UL — SIGNIFICANT CHANGE UP (ref 150–400)
POTASSIUM SERPL-MCNC: 4.3 MMOL/L — SIGNIFICANT CHANGE UP (ref 3.5–5.3)
POTASSIUM SERPL-SCNC: 4.3 MMOL/L — SIGNIFICANT CHANGE UP (ref 3.5–5.3)
RBC # BLD: 5.33 M/UL — SIGNIFICANT CHANGE UP (ref 4.2–5.8)
RBC # FLD: 12.4 % — SIGNIFICANT CHANGE UP (ref 10.3–14.5)
SARS-COV-2 RNA SPEC QL NAA+PROBE: SIGNIFICANT CHANGE UP
SODIUM SERPL-SCNC: 138 MMOL/L — SIGNIFICANT CHANGE UP (ref 135–145)
TROPONIN I SERPL-MCNC: 0.01 NG/ML — LOW (ref 0.02–0.06)
WBC # BLD: 6.59 K/UL — SIGNIFICANT CHANGE UP (ref 3.8–10.5)
WBC # FLD AUTO: 6.59 K/UL — SIGNIFICANT CHANGE UP (ref 3.8–10.5)

## 2021-10-03 PROCEDURE — 99285 EMERGENCY DEPT VISIT HI MDM: CPT

## 2021-10-03 PROCEDURE — 93010 ELECTROCARDIOGRAM REPORT: CPT

## 2021-10-03 PROCEDURE — 99233 SBSQ HOSP IP/OBS HIGH 50: CPT

## 2021-10-03 PROCEDURE — 71046 X-RAY EXAM CHEST 2 VIEWS: CPT | Mod: 26

## 2021-10-03 PROCEDURE — 99223 1ST HOSP IP/OBS HIGH 75: CPT | Mod: AI

## 2021-10-03 RX ORDER — METOPROLOL TARTRATE 50 MG
2.5 TABLET ORAL ONCE
Refills: 0 | Status: COMPLETED | OUTPATIENT
Start: 2021-10-03 | End: 2021-10-03

## 2021-10-03 RX ORDER — DILTIAZEM HCL 120 MG
10 CAPSULE, EXT RELEASE 24 HR ORAL ONCE
Refills: 0 | Status: COMPLETED | OUTPATIENT
Start: 2021-10-03 | End: 2021-10-03

## 2021-10-03 RX ORDER — ALPRAZOLAM 0.25 MG
0.25 TABLET ORAL ONCE
Refills: 0 | Status: DISCONTINUED | OUTPATIENT
Start: 2021-10-03 | End: 2021-10-03

## 2021-10-03 RX ORDER — ENOXAPARIN SODIUM 100 MG/ML
90 INJECTION SUBCUTANEOUS
Refills: 0 | Status: DISCONTINUED | OUTPATIENT
Start: 2021-10-03 | End: 2021-10-03

## 2021-10-03 RX ORDER — SODIUM CHLORIDE 9 MG/ML
1000 INJECTION INTRAMUSCULAR; INTRAVENOUS; SUBCUTANEOUS ONCE
Refills: 0 | Status: COMPLETED | OUTPATIENT
Start: 2021-10-03 | End: 2021-10-03

## 2021-10-03 RX ORDER — INFLUENZA VIRUS VACCINE 15; 15; 15; 15 UG/.5ML; UG/.5ML; UG/.5ML; UG/.5ML
0.5 SUSPENSION INTRAMUSCULAR ONCE
Refills: 0 | Status: COMPLETED | OUTPATIENT
Start: 2021-10-03 | End: 2021-10-03

## 2021-10-03 RX ORDER — DILTIAZEM HCL 120 MG
10 CAPSULE, EXT RELEASE 24 HR ORAL
Qty: 125 | Refills: 0 | Status: DISCONTINUED | OUTPATIENT
Start: 2021-10-03 | End: 2021-10-04

## 2021-10-03 RX ORDER — ACETAMINOPHEN 500 MG
650 TABLET ORAL EVERY 6 HOURS
Refills: 0 | Status: DISCONTINUED | OUTPATIENT
Start: 2021-10-03 | End: 2021-10-05

## 2021-10-03 RX ORDER — APIXABAN 2.5 MG/1
5 TABLET, FILM COATED ORAL EVERY 12 HOURS
Refills: 0 | Status: DISCONTINUED | OUTPATIENT
Start: 2021-10-03 | End: 2021-10-05

## 2021-10-03 RX ORDER — DILTIAZEM HCL 120 MG
30 CAPSULE, EXT RELEASE 24 HR ORAL EVERY 6 HOURS
Refills: 0 | Status: DISCONTINUED | OUTPATIENT
Start: 2021-10-03 | End: 2021-10-04

## 2021-10-03 RX ORDER — LANOLIN ALCOHOL/MO/W.PET/CERES
3 CREAM (GRAM) TOPICAL AT BEDTIME
Refills: 0 | Status: DISCONTINUED | OUTPATIENT
Start: 2021-10-03 | End: 2021-10-05

## 2021-10-03 RX ORDER — ONDANSETRON 8 MG/1
4 TABLET, FILM COATED ORAL EVERY 8 HOURS
Refills: 0 | Status: DISCONTINUED | OUTPATIENT
Start: 2021-10-03 | End: 2021-10-05

## 2021-10-03 RX ADMIN — Medication 0.25 MILLIGRAM(S): at 23:38

## 2021-10-03 RX ADMIN — APIXABAN 5 MILLIGRAM(S): 2.5 TABLET, FILM COATED ORAL at 08:34

## 2021-10-03 RX ADMIN — SODIUM CHLORIDE 1000 MILLILITER(S): 9 INJECTION INTRAMUSCULAR; INTRAVENOUS; SUBCUTANEOUS at 04:36

## 2021-10-03 RX ADMIN — Medication 30 MILLIGRAM(S): at 17:17

## 2021-10-03 RX ADMIN — Medication 30 MILLIGRAM(S): at 12:18

## 2021-10-03 RX ADMIN — Medication 10 MILLIGRAM(S): at 04:50

## 2021-10-03 RX ADMIN — Medication 30 MILLIGRAM(S): at 08:35

## 2021-10-03 RX ADMIN — Medication 10 MG/HR: at 20:34

## 2021-10-03 RX ADMIN — Medication 10 MILLIGRAM(S): at 04:37

## 2021-10-03 RX ADMIN — Medication 30 MILLIGRAM(S): at 23:38

## 2021-10-03 RX ADMIN — APIXABAN 5 MILLIGRAM(S): 2.5 TABLET, FILM COATED ORAL at 17:18

## 2021-10-03 RX ADMIN — SODIUM CHLORIDE 1000 MILLILITER(S): 9 INJECTION INTRAMUSCULAR; INTRAVENOUS; SUBCUTANEOUS at 06:30

## 2021-10-03 RX ADMIN — Medication 10 MG/HR: at 05:03

## 2021-10-03 RX ADMIN — Medication 2.5 MILLIGRAM(S): at 05:39

## 2021-10-03 RX ADMIN — SODIUM CHLORIDE 1000 MILLILITER(S): 9 INJECTION INTRAMUSCULAR; INTRAVENOUS; SUBCUTANEOUS at 05:17

## 2021-10-03 NOTE — ED PROVIDER NOTE - NSICDXPASTSURGICALHX_GEN_ALL_CORE_FT
PAST SURGICAL HISTORY:  H/O cataract extraction right eye    History of left cataract extraction     S/P right knee arthroscopy

## 2021-10-03 NOTE — ED ADULT NURSE REASSESSMENT NOTE - NSIMPLEMENTINTERV_GEN_ALL_ED
Implemented All Universal Safety Interventions:  [place] to call system. Call bell, personal items and telephone within reach. Instruct patient to call for assistance. Room bathroom lighting operational. Non-slip footwear when patient is off stretcher. Physically safe environment: no spills, clutter or unnecessary equipment. Stretcher in lowest position, wheels locked, appropriate side rails in place.

## 2021-10-03 NOTE — H&P ADULT - HISTORY OF PRESENT ILLNESS
71M with hx of lesion of vocal cord (s/p XRT, stopped 2 months ago, no chemo/surg), HLD who presents with rapid HR.  Patient was in his usual state of health with no acute complaints when he felt his HR racing this evening in bed.  No CP.  No headaches or dizziness.  Reportedly patient attempted to walk around and became SOB.  Came here for further evaluation.  Prior to the episode, patient reported having about 4 cups of caffeinated coffee (usually only drinks decaffeinated coffee daily and drinks only 1-2 drinks of caffeinated weekly) and some highly "caffeinated" brownies throughout the day.  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  Again, prior to the episode, patient was feeling asymptomatic with no fevers, cough, diarrhea, nausea/vomiting, abdominal pain.     In the ED, patient's triage vitals were /84, , RR 18, 98% and T 97.5F.  Patient's HR increased to as high as 147 and the EKG showed new onset afib.  Patient was given cardizem 10mg IV x2 with occasional drops of the HR to the 90-100s.  BP would also drop in the SBP 100s when he was tachycardic.  Patient was started on a cardizem drip as per ED.  Currently the patient feels fine with no acute complaints and no palpitations.

## 2021-10-03 NOTE — H&P ADULT - NSHPSOCIALHISTORY_GEN_ALL_CORE
- no smoking  - rare etoh (drank 1 beer this evening)  - no illegal drug use   - lives with wife  - works as a contractor

## 2021-10-03 NOTE — CONSULT NOTE ADULT - ASSESSMENT
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    Afib management - cardizem gtt  [facility] admission  serial CE  TTE  monitor VS and HD and Sat  D dimer normal  TFT  Cardio eval in progress  pulse ox - cardiac tele monitor  spoke with pt - discussed plan of care and findings  follows with [facility] Rad Onc and Med Onc

## 2021-10-03 NOTE — H&P ADULT - NSICDXFAMILYHX_GEN_ALL_CORE_FT
FAMILY HISTORY:  Father  Still living? Unknown  Family history of valvular heart disease, Age at diagnosis: Age Unknown    Mother  Still living? Unknown  FH: diabetes mellitus, Age at diagnosis: Age Unknown    Sibling  Still living? Unknown  Family history of AML (acute myeloid leukemia), Age at diagnosis: Age Unknown

## 2021-10-03 NOTE — DIETITIAN INITIAL EVALUATION ADULT. - OTHER INFO
Per H&P, Pt is a 70 y/o M with hx of lesion of vocal cord (s/p XRT, stopped 2 months ago, no chemo/surg), HLD who presents with rapid HR.  Patient was in his usual state of health with no acute complaints when he felt his HR racing last night in bed. No CP. No headaches or dizziness. Reportedly patient attempted to walk around and became SOB. Came here for further evaluation. Prior to the episode, patient reported having about 4 cups of caffeinated coffee (usually only drinks decaffeinated coffee daily and drinks only 1-2 drinks of caffeinated weekly) and some highly "caffeinated" brownies throughout the day.  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 Again, prior to the episode, patient was feeling asymptomatic with no fevers, cough, diarrhea, nausea/vomiting, abdominal pain.     Pt seen for nutrition assessment secondary to [facility] length of stay policy. Pt visited this afternoon while consuming his lunch. Pt reports good appetite and observed pt eating well. PO intakes % per RN documentation. Pt feeds self, reports no chewing/swallowing difficulties. No food allergies. Denies N/V/D/C. Last BM yesterday (10/2) per pt report. #. CBW on admission 199#. Pt reports no recent wt changes. No edema noted. Skin intact. Pt currently on DASH/TLC diet. Pt has no nutritional questions or concerns at this time. RD will continue to follow-up and monitor nutritional status. Per H&P, Pt is a 70 y/o M with hx of lesion of vocal cord (s/p XRT, stopped 2 months ago, no chemo/surg), HLD who presents with rapid HR.  Patient was in his usual state of health with no acute complaints when he felt his HR racing last night in bed. No CP. No headaches or dizziness. Reportedly patient attempted to walk around and became SOB. Came here for further evaluation. Prior to the episode, patient reported having about 4 cups of caffeinated coffee (usually only drinks decaffeinated coffee daily and drinks only 1-2 drinks of caffeinated weekly) and some highly "caffeinated" brownies throughout the day.  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 Again, prior to the episode, patient was feeling asymptomatic with no fevers, cough, diarrhea, nausea/vomiting, abdominal pain.     Pt seen for nutrition assessment secondary to [facility] length of stay policy. Pt visited this afternoon while consuming his lunch. Pt reports good appetite and observed pt eating well. PO intakes % per RN documentation. Pt feeds self, reports no chewing/swallowing difficulties. No food allergies. Denies N/V/D/C. Last BM yesterday (10/2) per pt report. #. CBW on admission 199#. Pt reports no recent wt changes. No edema noted. Skin intact. PTA pt states that he typically has a good appetite consuming 3 meals/days. Consumes water and seltzer throughout the day. Pt currently on DASH/TLC diet. Pt has no nutritional questions or concerns at this time. RD will continue to follow-up and monitor nutritional status.

## 2021-10-03 NOTE — ED PROVIDER NOTE - NSICDXPASTMEDICALHX_GEN_ALL_CORE_FT
PAST MEDICAL HISTORY:  Colonic polyp     Factor II deficiency     Hemorrhoids     Hyperlipemia     Lesion of vocal cord

## 2021-10-03 NOTE — ED PROVIDER NOTE - OBJECTIVE STATEMENT
71 y.o. M c/o rapid heart beat, states he turned on his pillow around midnight and heard the rapid beat in his ear, did not feel it, no cp, no sob, felt well otherwise, thought it was his anxiety so got up to walk around to try to rest, while walking felt some SALDANA, on arrival to triage HR 80s-110s, now 150s on monitor and is asymptomatic at this time, has never been evaluated for tachycardia, no h/o afib, states he had 1 light beer tonight, does sometimes get dehydrated, pt reports he usually drinks decaf coffee for months (since cancer treatment - last radiation 2 months ago, vocal cord), but today may have had 4 cups of regular caffeinated coffee unintentionally

## 2021-10-03 NOTE — DIETITIAN INITIAL EVALUATION ADULT. - PERTINENT MEDS FT
MEDICATIONS  (STANDING):  apixaban 5 milliGRAM(s) Oral every 12 hours  diltiazem    Tablet 30 milliGRAM(s) Oral every 6 hours  diltiazem Infusion 10 mG/Hr (10 mL/Hr) IV Continuous <Continuous>  influenza   Vaccine 0.5 milliLiter(s) IntraMuscular once    MEDICATIONS  (PRN):  acetaminophen   Tablet .. 650 milliGRAM(s) Oral every 6 hours PRN Temp greater or equal to 38C (100.4F), Mild Pain (1 - 3)  aluminum hydroxide/magnesium hydroxide/simethicone Suspension 30 milliLiter(s) Oral every 4 hours PRN Dyspepsia  melatonin 3 milliGRAM(s) Oral at bedtime PRN Insomnia  ondansetron Injectable 4 milliGRAM(s) IV Push every 8 hours PRN Nausea and/or Vomiting

## 2021-10-03 NOTE — ED PROVIDER NOTE - CLINICAL SUMMARY MEDICAL DECISION MAKING FREE TEXT BOX
new onset afib, with rvr - iv, labs, hydrate, iv diltiazem, may require admission for rate control vs conversion and cardiology cs and further assessment

## 2021-10-03 NOTE — H&P ADULT - NSHPREVIEWOFSYSTEMS_GEN_ALL_CORE
REVIEW OF SYSTEMS:  CONSTITUTIONAL:    no fever or weight loss or fatigue  EYES:    no eye pain or visual disturbances or discharge  ENMT:     no difficulty hearing or tinnitus or vertigo, no sinus or throat pain  NECK:    no pain or stiffness  RESPIRATORY:    no cough or wheezing or chills or hemoptysis, no shortness of breath  CARDIOVASCULAR:    +palpitations, no chest pain or dizziness or leg swelling  GASTROINTESTINAL:    no abdominal or epigastric pain. no nausea or vomiting or hematemesis, no diarrhea or constipation. no melena or hematochezia.  GENITOURINARY:    no dysuria or frequency or hematuria or incontinence  NEUROLOGICAL:    no headaches or memory loss or loss of strength or numbness or tremors  SKIN:    no itching or burning or rashes or lesions   LYMPH NODES:    no enlarged glands  ENDOCRINE:    no heat or cold intolerance, no hair loss, no polydipsia or polyuria  MUSCULOSKELETAL:    no joint pain or swelling, no muscle or back or extremity pain  PSYCHIATRIC:    no depression or anxiety or mood swings or difficulty sleeping  HEME/LYMPH:    no easy bruising or bleeding gums  ALLERGY AND IMMUNOLOGIC:    no hives or eczema

## 2021-10-03 NOTE — ED ADULT NURSE REASSESSMENT NOTE - NS ED NURSE REASSESS COMMENT FT1
report received from MALINA Jarvis. pt appears to be in no acute distress. pt admitted to [facility], awaiting bed. safety maintained. report received from MALINA Jarvis. pt appears to be in no acute distress. pt admitted to [facility], awaiting bed. cardizem drip remains infusing in left 18 IV lock. safety maintained.

## 2021-10-03 NOTE — PROGRESS NOTE ADULT - ASSESSMENT
71 year old male PMHx Vocal Cord Mass ( s/p XRT Last 2 months ago - Pt claims resolution), HLD,. Never Smoker.  Admitted 10/3/2021 after feeling his heart racing attempting to sleep.      Atrial Fibulation with Rapid Ventricular Response      Awake, Alert with No complaints   Unlabored on NC   Dilt Gtt for rate control - Addition of IV lopressor with better rate control   Would Add Lopressor 5mg Q 6hr   Attempt to titrate Dilt gtt to off   Chads-Vasc = 1   Despite Low [facility]   Cardiology to eval   Needs TTE  Trend cardia markers - 1st neg - doubt myoischemia as cause   Diet as tolerates   Lytes are Ok K>4 Mg > 2   Case D/W admitting Dr Sotelo   71 year old male PMHx Vocal Cord Mass ( s/p XRT Last 2 months ago - Pt claims resolution), HLD,. Never Smoker.  Admitted 10/3/2021 after feeling his heart racing attempting to sleep.      Atrial Fibulation with Rapid Ventricular Response      Awake, Alert with No complaints   Unlabored on NC   Dilt Gtt for rate control - Addition of IV lopressor with better rate control   Would Add Lopressor 5mg Q 6hr   Attempt to titrate Dilt gtt to off   Chads-Vasc = 1   [facility]   Cardiology to eval   Needs TTE  Trend cardia markers - 1st neg - doubt myoischemia as cause   Diet as tolerates   Lytes are Ok K>4 Mg > 2   Case D/W admitting Dr Sotelo

## 2021-10-03 NOTE — H&P ADULT - NSHPLABSRESULTS_GEN_ALL_CORE
LABS:                        16.6   6.59  )-----------( 172      ( 03 Oct 2021 04:38 )             48.0     138    |  106    |  25<H>  ----------------------------<  122<H>    03 Oct 2021 04:38  4.3     |  26     |  0.91     Ca 8.4           03 Oct 2021 04:38    Mg 2.1       03 Oct 2021 04:38    Troponin trend:  .005  10-03 @ 04:38    EKG:  afib with , LAD

## 2021-10-03 NOTE — CONSULT NOTE ADULT - SUBJECTIVE AND OBJECTIVE BOX
Date/Time Patient Seen:  		  Referring MD:   Data Reviewed	       Patient is a 71y old  Male who presents with a chief complaint of afib (03 Oct 2021 06:13)      Subjective/HPI     old records reviewed  ct chest reviewed from June 2021  H and P reviewed  ER provider note reviewed  admitted to SPCU for rapid AF  labs and imaging reviewed  vs noted    71M with hx of lesion of vocal cord (s/p XRT, stopped 2 months ago, no chemo/surg), HLD who presents with rapid HR.  Patient was in his usual state of health with no acute complaints when he felt his HR racing this evening in bed.  No CP.  No headaches or dizziness.  Reportedly patient attempted to walk around and became SOB.  Came here for further evaluation.  Prior to the episode, patient reported having about 4 cups of caffeinated coffee (usually only drinks decaffeinated coffee daily and drinks only 1-2 drinks of caffeinated weekly) and some highly "caffeinated" brownies throughout the day.  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  Again, prior to the episode, patient was feeling asymptomatic with no fevers, cough, diarrhea, nausea/vomiting, abdominal pain.     In the ED, patient's triage vitals were /84, , RR 18, 98% and T 97.5F.  Patient's HR increased to as high as 147 and the EKG showed new onset afib.  Patient was given cardizem 10mg IV x2 with occasional drops of the HR to the 90-100s.  BP would also drop in the SBP 100s when he was tachycardic.  Patient was started on a cardizem drip as per ED.  Currently the patient feels fine with no acute complaints and no palpitations.        FAMILY HISTORY:  Father  Still living? Unknown  Family history of valvular heart disease, Age at diagnosis: Age Unknown    Mother  Still living? Unknown  FH: diabetes mellitus, Age at diagnosis: Age Unknown    Sibling  Still living? Unknown  Family history of AML (acute myeloid leukemia), Age at diagnosis: Age Unknown.     Social History:  Social History (marital status, living situation, occupation, tobacco use, alcohol and drug use, and sexual history): - no smoking  - rare etoh (drank 1 beer this evening)  - no illegal drug use   - lives with wife  - works as a contractor     Tobacco Screening:  · Core Measure Site	No  · Has the patient used tobacco in the past 30 days?	No    Risk Assessment:    Present on Admission:  Deep Venous Thrombosis	no  Pulmonary Embolus	no     Heart Failure:  Does this patient have a history of or has been diagnosed with heart failure? unknown.    PAST MEDICAL & SURGICAL HISTORY:  Lesion of vocal cord    Hyperlipemia    Colonic polyp    Factor II deficiency    Hemorrhoids    Throat cancer    Localized cancer of throat    Localized cancer of throat    No significant past surgical history    History of left cataract extraction    H/O cataract extraction  right eye    S/P right knee arthroscopy          Medication list         MEDICATIONS  (STANDING):  diltiazem Infusion 10 mG/Hr (10 mL/Hr) IV Continuous <Continuous>  enoxaparin Injectable 90 milliGRAM(s) SubCutaneous two times a day    MEDICATIONS  (PRN):  acetaminophen   Tablet .. 650 milliGRAM(s) Oral every 6 hours PRN Temp greater or equal to 38C (100.4F), Mild Pain (1 - 3)  aluminum hydroxide/magnesium hydroxide/simethicone Suspension 30 milliLiter(s) Oral every 4 hours PRN Dyspepsia  melatonin 3 milliGRAM(s) Oral at bedtime PRN Insomnia  ondansetron Injectable 4 milliGRAM(s) IV Push every 8 hours PRN Nausea and/or Vomiting         Vitals log        ICU Vital Signs Last 24 Hrs  T(C): 36.4 (03 Oct 2021 04:00), Max: 36.4 (03 Oct 2021 04:00)  T(F): 97.5 (03 Oct 2021 04:00), Max: 97.5 (03 Oct 2021 04:00)  HR: 127 (03 Oct 2021 05:29) (114 - 140)  BP: 119/75 (03 Oct 2021 05:29) (96/79 - 128/86)  BP(mean): --  ABP: --  ABP(mean): --  RR: 18 (03 Oct 2021 05:29) (16 - 18)  SpO2: 96% (03 Oct 2021 05:29) (96% - 98%)           Input and Output:  I&O's Detail      Lab Data                        16.6   6.59  )-----------( 172      ( 03 Oct 2021 04:38 )             48.0     10-03    138  |  106  |  25<H>  ----------------------------<  122<H>  4.3   |  26  |  0.91    Ca    8.4      03 Oct 2021 04:38  Mg     2.1     10-03        CARDIAC MARKERS ( 03 Oct 2021 04:38 )  .005 ng/mL / x     / x     / x     / x            Review of Systems	  tachy  cp  sob      Objective     Physical Examination        Pertinent Lab findings & Imaging      [place]:  NO   Adequate UO     I&O's Detail           Discussed with:     Cultures:	        Radiology      EXAM:  CT CHEST IC        PROCEDURE DATE:  06/10/2021           INTERPRETATION:  CLINICAL INFORMATION: Vocal cord cancer    COMPARISON: None.    CONTRAST/COMPLICATIONS:  IV Contrast: Omnipaque 350  90 cc administered   10 cc discarded  Oral Contrast: NONE  Complications: None reported at time of study completion    PROCEDURE:  CT of the Chest was performed.  Sagittal and coronal reformats were performed.      FINDINGS:    AIRWAYS, LUNGS and PLEURA: Patent central airways. Clear lungs. No suspicious lung nodule. No pleural effusion.    MEDIASTINUM AND ADAL: No lymphadenopathy.    HEART AND VESSELS: Heart size is normal. Mild coronary calcification. No pericardial effusion. Thoracic aorta and pulmonary artery normal in diameter.    VISUALIZED UPPER ABDOMEN: Within normal limits.    CHEST WALL AND BONES: No aggressive osseous lesion. Old fracture deformity of posterior left 9th rib    LOWER NECK: Please review the report of the CT of the neck performed the same day.    IMPRESSION:    No metastatic disease.               JAYY SUN MD; Attending Radiologist   This document has been electronically signed. Jun 11 2021  8:20AM                         Date/Time Patient Seen:  		  Referring MD:   Data Reviewed	       Patient is a 71y old  Male who presents with a chief complaint of afib (03 Oct 2021 06:13)      Subjective/HPI     old records reviewed  ct chest reviewed from June 2021  H and P reviewed  ER provider note reviewed  admitted to SPCU for rapid AF  labs and imaging reviewed  vs noted    71M with hx of lesion of vocal cord (s/p XRT, stopped 2 months ago, no chemo/surg), HLD who presents with rapid HR.  Patient was in his usual state of health with no acute complaints when he felt his HR racing this evening in bed.  No CP.  No headaches or dizziness.  Reportedly patient attempted to walk around and became SOB.  Came here for further evaluation.  Prior to the episode, patient reported having about 4 cups of caffeinated coffee (usually only drinks decaffeinated coffee daily and drinks only 1-2 drinks of caffeinated weekly) and some highly "caffeinated" brownies throughout the day.  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  Again, prior to the episode, patient was feeling asymptomatic with no fevers, cough, diarrhea, nausea/vomiting, abdominal pain.     In the ED, patient's triage vitals were /84, , RR 18, 98% and T 97.5F.  Patient's HR increased to as high as 147 and the EKG showed new onset afib.  Patient was given cardizem 10mg IV x2 with occasional drops of the HR to the 90-100s.  BP would also drop in the SBP 100s when he was tachycardic.  Patient was started on a cardizem drip as per ED.  Currently the patient feels fine with no acute complaints and no palpitations.        FAMILY HISTORY:  Father  Still living? Unknown  Family history of valvular heart disease, Age at diagnosis: Age Unknown    Mother  Still living? Unknown  FH: diabetes mellitus, Age at diagnosis: Age Unknown    Sibling  Still living? Unknown  Family history of AML (acute myeloid leukemia), Age at diagnosis: Age Unknown.     Social History:  Social History (marital status, living situation, occupation, tobacco use, alcohol and drug use, and sexual history): - no smoking  - rare etoh (drank 1 beer this evening)  - no illegal drug use   - lives with wife  - works as a contractor     Tobacco Screening:  · Core Measure Site	No  · Has the patient used tobacco in the past 30 days?	No    Risk Assessment:    Present on Admission:  Deep Venous Thrombosis	no  Pulmonary Embolus	no     Heart Failure:  Does this patient have a history of or has been diagnosed with heart failure? unknown.    PAST MEDICAL & SURGICAL HISTORY:  Lesion of vocal cord    Hyperlipemia    Colonic polyp    Factor II deficiency    Hemorrhoids    Throat cancer    Localized cancer of throat    Localized cancer of throat    No significant past surgical history    History of left cataract extraction    H/O cataract extraction  right eye    S/P right knee arthroscopy          Medication list         MEDICATIONS  (STANDING):  diltiazem Infusion 10 mG/Hr (10 mL/Hr) IV Continuous <Continuous>  enoxaparin Injectable 90 milliGRAM(s) SubCutaneous two times a day    MEDICATIONS  (PRN):  acetaminophen   Tablet .. 650 milliGRAM(s) Oral every 6 hours PRN Temp greater or equal to 38C (100.4F), Mild Pain (1 - 3)  aluminum hydroxide/magnesium hydroxide/simethicone Suspension 30 milliLiter(s) Oral every 4 hours PRN Dyspepsia  melatonin 3 milliGRAM(s) Oral at bedtime PRN Insomnia  ondansetron Injectable 4 milliGRAM(s) IV Push every 8 hours PRN Nausea and/or Vomiting         Vitals log        ICU Vital Signs Last 24 Hrs  T(C): 36.4 (03 Oct 2021 04:00), Max: 36.4 (03 Oct 2021 04:00)  T(F): 97.5 (03 Oct 2021 04:00), Max: 97.5 (03 Oct 2021 04:00)  HR: 127 (03 Oct 2021 05:29) (114 - 140)  BP: 119/75 (03 Oct 2021 05:29) (96/79 - 128/86)  BP(mean): --  ABP: --  ABP(mean): --  RR: 18 (03 Oct 2021 05:29) (16 - 18)  SpO2: 96% (03 Oct 2021 05:29) (96% - 98%)           Input and Output:  I&O's Detail      Lab Data                        16.6   6.59  )-----------( 172      ( 03 Oct 2021 04:38 )             48.0     10-03    138  |  106  |  25<H>  ----------------------------<  122<H>  4.3   |  26  |  0.91    Ca    8.4      03 Oct 2021 04:38  Mg     2.1     10-03        CARDIAC MARKERS ( 03 Oct 2021 04:38 )  .005 ng/mL / x     / x     / x     / x            Review of Systems	  tachy  cp  sob      Objective     Physical Examination    tachy  heart s1s2  lung dec BS  abd soft  head nc  verbal  alert  cn grossly int      Pertinent Lab findings & Imaging      [place]:  NO   Adequate UO     I&O's Detail           Discussed with:     Cultures:	        Radiology      EXAM:  CT CHEST IC        PROCEDURE DATE:  06/10/2021           INTERPRETATION:  CLINICAL INFORMATION: Vocal cord cancer    COMPARISON: None.    CONTRAST/COMPLICATIONS:  IV Contrast: Omnipaque 350  90 cc administered   10 cc discarded  Oral Contrast: NONE  Complications: None reported at time of study completion    PROCEDURE:  CT of the Chest was performed.  Sagittal and coronal reformats were performed.      FINDINGS:    AIRWAYS, LUNGS and PLEURA: Patent central airways. Clear lungs. No suspicious lung nodule. No pleural effusion.    MEDIASTINUM AND ADAL: No lymphadenopathy.    HEART AND VESSELS: Heart size is normal. Mild coronary calcification. No pericardial effusion. Thoracic aorta and pulmonary artery normal in diameter.    VISUALIZED UPPER ABDOMEN: Within normal limits.    CHEST WALL AND BONES: No aggressive osseous lesion. Old fracture deformity of posterior left 9th rib    LOWER NECK: Please review the report of the CT of the neck performed the same day.    IMPRESSION:    No metastatic disease.               JAYY SUN MD; Attending Radiologist   This document has been electronically signed. Jun 11 2021  8:20AM

## 2021-10-03 NOTE — H&P ADULT - NSICDXPASTMEDICALHX_GEN_ALL_CORE_FT
PAST MEDICAL HISTORY:  Colonic polyp     Factor II deficiency     Hemorrhoids     Hyperlipemia     Lesion of vocal cord     Localized cancer of throat     Localized cancer of throat     Throat cancer

## 2021-10-03 NOTE — H&P ADULT - ASSESSMENT
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     New onset afib - likely precipitated by the caffeine   - admit to [facility] (on cardizem drip)  - tried lopressor 2.5mg IV x1 which seemed to have a good effect -> may try another dose if needed  - for now, continue with the cardizem drip -> monitor BP  - cardiology consult  - cc consult  - check TTE  - has a [id number] score of 1 -> may need anticoagulation long term  - will start with lovenox 1mg/kg BID (comes out to 90mg BID)    Preventive measures  - will be on therapeutic lovenox

## 2021-10-03 NOTE — CONSULT NOTE ADULT - ASSESSMENT
The patient is a 71 year old male with a history of vocal cord lesion s/p radiation therapy, HL who presents with tachycardia in the setting of rapid atrial fibrillation.    Plan:  - Continue diltiazem drip and titrate off as tolerated  - Start diltiazem 30 mg PO q6h  - BP on low side - may need addition of digoxin if remains rapid and BP trends down  - Start apixaban 5 mg bid. We discussed that his [id number] is low/intermediate and that anticoagulation may not be needed long term.  - Check echo  - Check TSH

## 2021-10-03 NOTE — ED ADULT TRIAGE NOTE - CHIEF COMPLAINT QUOTE
"racing heart" since midnight. denies chest pain/pressure. denies cardiac hx. recent hx cancerous lesion on vocal cord. completed radiation 2 months ago.

## 2021-10-03 NOTE — CONSULT NOTE ADULT - SUBJECTIVE AND OBJECTIVE BOX
History of Present Illness: The patient is a 71 year old male with a history of vocal cord lesion s/p radiation therapy, HL who presents with tachycardia. He noted increased pulse in his ear while lying down at night. He denies palpitations, chest pain, shortness of breath, dizziness. He thought it may have been anxiety so he got up to walk around, but symptoms did not improve. He states he had more caffeine than usual yesterday. He also noted the possibility that he was dehydrated.    Past Medical/Surgical History:  vocal cord lesion s/p radiation therapy, HL    Medications:  Home Medications:  Vitamin D3 2000 intl units (50 mcg) oral tablet: 1 tab(s) orally once a day (19 May 2021 13:08)      Family History: Non-contributory family history of premature cardiovascular atherosclerotic disease    Social History: No tobacco, alcohol or drug use    Review of Systems:  General: No fevers, chills, weight loss or gain  Skin: No rashes, color changes  Cardiovascular: No chest pain, orthopnea  Respiratory: No shortness of breath, cough  Gastrointestinal: No nausea, abdominal pain  Genitourinary: No incontinence, pain with urination  Musculoskeletal: No pain, swelling, decreased range of motion  Neurological: No headache, weakness  Psychiatric: No depression, anxiety  Endocrine: No weight loss or gain, increased thirst  All other systems are comprehensively negative.    Physical Exam:  Vitals:        Vital Signs Last 24 Hrs  T(C): 36.9 (03 Oct 2021 08:49), Max: 36.9 (03 Oct 2021 08:49)  T(F): 98.5 (03 Oct 2021 08:49), Max: 98.5 (03 Oct 2021 08:49)  HR: 105 (03 Oct 2021 08:49) (105 - 140)  BP: 105/71 (03 Oct 2021 08:49) (93/53 - 128/86)  BP(mean): --  RR: 18 (03 Oct 2021 08:49) (16 - 20)  SpO2: 99% (03 Oct 2021 08:49) (96% - 99%)  General: NAD  HEENT: MMM  Neck: No JVD, no carotid bruit  Lungs: CTAB  CV: RRR, nl S1/S2, no M/R/G  Abdomen: S/NT/ND, +BS  Extremities: No LE edema, no cyanosis  Neuro: AAOx3, non-focal  Skin: No rash    Labs:                        16.6   6.59  )-----------( 172      ( 03 Oct 2021 04:38 )             48.0     10-03    138  |  106  |  25<H>  ----------------------------<  122<H>  4.3   |  26  |  0.91    Ca    8.4      03 Oct 2021 04:38  Mg     2.1     10-03      CARDIAC MARKERS ( 03 Oct 2021 04:38 )  .005 ng/mL / x     / x     / x     / x              ECG: AF, normal axis, nonspecific ST abnormality

## 2021-10-03 NOTE — ED ADULT NURSE NOTE - PAIN RATING/NUMBER SCALE (0-10): ACTIVITY
Daily Progress Note: 8/3/2017  Admission Date: 7/31/2017    SURGICAL  Procedure(s):  CORONARY ARTERY BYPASS GRAFT X 2, LIMA    VEIN HARVEST GREATER SAPHENOUS  ESOPHAGEAL TRANS ECHOCARDIOGRAM --- 3 Days Post-Op :  7/31/2017    Chart Reviewed. Subjective:     Continues to do well with strong family support.    Pain controlled   ++BM    Objective:     Current Facility-Administered Medications   Medication Dose Route Frequency    guaiFENesin ER (MUCINEX) tablet 600 mg  600 mg Oral Q12H    senna-docusate (PERICOLACE) 8.6-50 mg per tablet 2 Tab  2 Tab Oral DAILY    sodium chloride (NS) flush 5-10 mL  5-10 mL IntraVENous Q8H    sodium chloride (NS) flush 5-10 mL  5-10 mL IntraVENous PRN    alum-mag hydroxide-simeth (MYLANTA) oral suspension 30 mL  30 mL Oral Q4H PRN    acetaminophen (TYLENOL) tablet 650 mg  650 mg Oral Q4H PRN    zolpidem (AMBIEN) tablet 5 mg  5 mg Oral QHS PRN    atorvastatin (LIPITOR) tablet 80 mg  80 mg Oral QHS    magnesium oxide (MAG-OX) tablet 400 mg  400 mg Oral TID PRN    magnesium oxide (MAG-OX) tablet 400 mg  400 mg Oral QID PRN    potassium chloride (K-DUR, KLOR-CON) SR tablet 20 mEq  20 mEq Oral BID PRN    potassium chloride (K-DUR, KLOR-CON) SR tablet 40 mEq  40 mEq Oral BID PRN    famotidine (PEPCID) tablet 20 mg  20 mg Oral Q12H    mupirocin (BACTROBAN) 2 % ointment   Both Nostrils Q12H    READ PPD  1 Each Other Q24H    traMADol (ULTRAM) tablet 50 mg  50 mg Oral Q6H PRN    oxyCODONE-acetaminophen (PERCOCET) 5-325 mg per tablet 1 Tab  1 Tab Oral Q4H PRN    metoprolol tartrate (LOPRESSOR) tablet 25 mg  25 mg Oral Q8H    oxyCODONE-acetaminophen (PERCOCET 10)  mg per tablet 1 Tab  1 Tab Oral Q4H PRN    amiodarone (CORDARONE) tablet 200 mg  200 mg Oral Q12H    aspirin chewable tablet 81 mg  81 mg Oral DAILY     Vitals:    08/03/17 0233 08/03/17 0235 08/03/17 0601 08/03/17 0656   BP:   106/57 123/63   Pulse:   94 84   Resp:    19   Temp:    98.4 °F (36.9 °C)   SpO2: 92%   92%   Weight:  162 lb 3.2 oz (73.6 kg)     Height:         Physical Exam:            GEN: well developed and in no acute distress,   HEENT:  PERRL, EOMI, no alar flaring or epistaxis, oral mucosa moist without cyanosis,   NECK:  no JVD, no retractions, no thyromegaly or masses,   LUNGS:  Clear, diminished at bases  CHEST: incision dry/healing with stable ecchymosis. HEART:  RRR with no M,G,R; ST elevation/endocarditis  ABDOMEN:  soft with no tenderness, positive bowel sounds present  EXTREMITIES:  warm with no cyanosis, no edema  SKIN:  no jaundice or ecchymosis   NEURO:  alert and oriented, grossly non-focal        LAB:  Recent Labs      08/03/17   0325  08/02/17   0350  08/01/17   0215  07/31/17   2200   07/31/17   1415   WBC   --   5.9  5.0   --    --   4.7   HGB   --   10.5*  11.1*  11.3*   < >  11.8*   HCT   --   31.1*  32.3*  33.4*   < >  35.5*   PLT  108*  111*  112*   --    --   103*    < > = values in this interval not displayed. Recent Labs      08/03/17   0325  08/02/17   0350  08/01/17   0215   07/31/17   1415   NA   --    --   144   --   144   K  3.8  4.0  4.1   < >  3.8   CL   --    --   112*   --   109*   GLU   --    --   133*   --   87   CO2   --    --   22   --   25   BUN   --    --   14   --   18   CREA   --    --   0.72*   --   0.74*   MG  2.2  2.2  2.4   < >  3.4*   INR   --    --    --    --   1.2    < > = values in this interval not displayed.      Recent Labs      07/31/17   1415   PH  7.37   PCO2  42   PO2  270*   HCO3  24       Assessment:     Principal Problem:    Coronary artery disease involving native coronary artery of native heart with unstable angina pectoris ([facility] [address] (7/31/2017)      Overview: 7/31/17 (Dr Precious Yoon) CORONARY ARTERY BYPASS GRAFT X 2, LIMA to the LAD, SVG       to the (L) PDA        VEIN HARVEST GREATER SAPHENOUS      ESOPHAGEAL TRANS ECHOCARDIOGRAM    Active Problems:    Hypertension ()      Hypercholesterolemia ()      Hypoxia (7/31/2017)      S/P CABG (coronary artery bypass graft) (7/31/2017)      Stenosis of right carotid artery without cerebral infarction (8/2/2017)        Plan:     POD 3 CABG x 2  Recheck plts as OP by Home health Monday  Vascular Surgery Consult-  R ICA stenosis- will follow up as OP  ST elevation on tele endocarditis  Bowel regimen  DC today with home health      Charlestine Saint, NP 0

## 2021-10-03 NOTE — CHART NOTE - NSCHARTNOTEFT_GEN_A_CORE
Called by RN to assess pt with anxiety.  Pt seen and examined pt states of feeling anxious he states usually he walks outside when this happens at home and not on any medications.  Pt is new Afib on cardizem drip with anxiety HR up to 130's  BP stable.  Give on dose xanax 0.25mg po will monitor.

## 2021-10-03 NOTE — PROGRESS NOTE ADULT - ASSESSMENT
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     New onset afib - likely precipitated by the caffeine   - admit to [facility]U (on cardizem drip)  - tried lopressor 2.5mg IV x1 which seemed to have a good effect -> may try another dose if needed  - for now, continue with the cardizem drip -> monitor BP  - cardiology Delfino-  - Continue diltiazem drip and titrate off as tolerated  - Start diltiazem 30 mg PO q6h  - BP on low side - may need addition of digoxin if remains rapid and BP trends down  - Start apixaban 5 mg bid. We discussed that his [id number] is low/intermediate and that anticoagulation may not be needed long term.  - Check echo  - Check TSH      Preventive measures  eliquse.    Plan of care was discuss with patient, all questions were answered, seems understand and in agreement.

## 2021-10-03 NOTE — H&P ADULT - NSHPPHYSICALEXAM_GEN_ALL_CORE
PHYSICAL EXAM:  Vital Signs Last 24 Hrs  T(C): 36.4 (03 Oct 2021 04:00), Max: 36.4 (03 Oct 2021 04:00)  T(F): 97.5 (03 Oct 2021 04:00), Max: 97.5 (03 Oct 2021 04:00)  HR: 127 (03 Oct 2021 05:29) (114 - 140)  BP: 119/75 (03 Oct 2021 05:29) (96/79 - 128/86)  BP(mean): --  RR: 18 (03 Oct 2021 05:29) (16 - 18)  SpO2: 96% (03 Oct 2021 05:29) (96% - 98%)    GENERAL:     well appearing male in NAD  HEAD:     atraumatic, normocephalic  EYES:     EOMI, conjunctiva and sclera clear  ENMT:     no tonsillar erythema or exudates or enlargement, no oral lesions, moist mucous membranes, good dentition  NECK:     supple, no JVD  RESPIRATORY:     clear to auscultation bilaterally, no rales or rhonchi or wheezing or rubs  CARDIOVASCULAR:     irregular irregular  GASTROINTESTINAL:     soft, nontender, nondistended, no hepatosplenomegaly palpated, bowel sounds present  EXTREMITIES:     no clubbing or cyanosis or edema  MUSCULOSKELETAL:     no joint pain or swelling or deformities  NERVOUS SYSTEM:     motor strength intact with 5/5 B/L upper and lower extremities, no gross sensory deficits  SKIN:     no rashes or lesions  PSYCH:     appropriate, alert and orientated x3, good concentration

## 2021-10-04 ENCOUNTER — TRANSCRIPTION ENCOUNTER (OUTPATIENT)
Age: 71
End: 2021-10-04

## 2021-10-04 LAB
COVID-19 SPIKE DOMAIN AB INTERP: POSITIVE
COVID-19 SPIKE DOMAIN ANTIBODY RESULT: >250 U/ML — HIGH
HCT VFR BLD CALC: 47.7 % — SIGNIFICANT CHANGE UP (ref 39–50)
HCV AB S/CO SERPL IA: 0.14 S/CO — SIGNIFICANT CHANGE UP (ref 0–0.99)
HCV AB SERPL-IMP: SIGNIFICANT CHANGE UP
HGB BLD-MCNC: 16.6 G/DL — SIGNIFICANT CHANGE UP (ref 13–17)
MCHC RBC-ENTMCNC: 31 PG — SIGNIFICANT CHANGE UP (ref 27–34)
MCHC RBC-ENTMCNC: 34.8 GM/DL — SIGNIFICANT CHANGE UP (ref 32–36)
MCV RBC AUTO: 89 FL — SIGNIFICANT CHANGE UP (ref 80–100)
NRBC # BLD: 0 /100 WBCS — SIGNIFICANT CHANGE UP (ref 0–0)
PLATELET # BLD AUTO: 164 K/UL — SIGNIFICANT CHANGE UP (ref 150–400)
RBC # BLD: 5.36 M/UL — SIGNIFICANT CHANGE UP (ref 4.2–5.8)
RBC # FLD: 12.4 % — SIGNIFICANT CHANGE UP (ref 10.3–14.5)
SARS-COV-2 IGG+IGM SERPL QL IA: >250 U/ML — HIGH
SARS-COV-2 IGG+IGM SERPL QL IA: POSITIVE
TSH SERPL-MCNC: 3.72 UIU/ML — SIGNIFICANT CHANGE UP (ref 0.27–4.2)
WBC # BLD: 7.05 K/UL — SIGNIFICANT CHANGE UP (ref 3.8–10.5)
WBC # FLD AUTO: 7.05 K/UL — SIGNIFICANT CHANGE UP (ref 3.8–10.5)

## 2021-10-04 PROCEDURE — 99233 SBSQ HOSP IP/OBS HIGH 50: CPT

## 2021-10-04 RX ORDER — DIGOXIN 250 MCG
250 TABLET ORAL DAILY
Refills: 0 | Status: DISCONTINUED | OUTPATIENT
Start: 2021-10-05 | End: 2021-10-05

## 2021-10-04 RX ORDER — DILTIAZEM HCL 120 MG
1 CAPSULE, EXT RELEASE 24 HR ORAL
Qty: 0 | Refills: 0 | DISCHARGE
Start: 2021-10-04

## 2021-10-04 RX ORDER — DIGOXIN 250 MCG
1 TABLET ORAL
Qty: 0 | Refills: 0 | DISCHARGE
Start: 2021-10-04

## 2021-10-04 RX ORDER — DILTIAZEM HCL 120 MG
240 CAPSULE, EXT RELEASE 24 HR ORAL DAILY
Refills: 0 | Status: DISCONTINUED | OUTPATIENT
Start: 2021-10-04 | End: 2021-10-04

## 2021-10-04 RX ORDER — APIXABAN 2.5 MG/1
1 TABLET, FILM COATED ORAL
Qty: 0 | Refills: 0 | DISCHARGE
Start: 2021-10-04

## 2021-10-04 RX ORDER — DIGOXIN 250 MCG
500 TABLET ORAL ONCE
Refills: 0 | Status: COMPLETED | OUTPATIENT
Start: 2021-10-04 | End: 2021-10-04

## 2021-10-04 RX ORDER — DILTIAZEM HCL 120 MG
360 CAPSULE, EXT RELEASE 24 HR ORAL DAILY
Refills: 0 | Status: DISCONTINUED | OUTPATIENT
Start: 2021-10-05 | End: 2021-10-05

## 2021-10-04 RX ORDER — DIGOXIN 250 MCG
250 TABLET ORAL EVERY 6 HOURS
Refills: 0 | Status: COMPLETED | OUTPATIENT
Start: 2021-10-04 | End: 2021-10-04

## 2021-10-04 RX ADMIN — Medication 250 MICROGRAM(S): at 15:16

## 2021-10-04 RX ADMIN — Medication 3 MILLIGRAM(S): at 23:28

## 2021-10-04 RX ADMIN — Medication 500 MICROGRAM(S): at 09:06

## 2021-10-04 RX ADMIN — Medication 240 MILLIGRAM(S): at 09:10

## 2021-10-04 RX ADMIN — APIXABAN 5 MILLIGRAM(S): 2.5 TABLET, FILM COATED ORAL at 05:51

## 2021-10-04 RX ADMIN — Medication 30 MILLIGRAM(S): at 05:51

## 2021-10-04 RX ADMIN — APIXABAN 5 MILLIGRAM(S): 2.5 TABLET, FILM COATED ORAL at 18:08

## 2021-10-04 RX ADMIN — Medication 250 MICROGRAM(S): at 21:04

## 2021-10-04 NOTE — DISCHARGE NOTE PROVIDER - HOSPITAL COURSE
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     New onset afib - likely precipitated by the caffeine   - Echo with normal LV systolic function, no significant valve issues  - TSH normal  - Discontinue diltiazem drip  - Start diltiazem  mg daily  - s/p digoxin 0.5 mg IVP now then 0.25 mg IVP q6h x2 doses.    Digoxin  0.25 mg PO daily  - Continue apixaban 5 mg bid  He will likely need to follow-up with EP as outpatient for consideration of DCCV vs. ablation if he remains in AF.      T(C): 36.4 (10-05-21 @ 04:06), Max: 36.7 (10-04-21 @ 21:19)  HR: 74 (10-05-21 @ 10:00) (70 - 137)  BP: 113/72 (10-05-21 @ 10:00) (102/69 - 134/84)  RR: 27 (10-05-21 @ 10:00) (8 - 30)  SpO2: 99% (10-05-21 @ 10:00) (95% - 100%)  Wt(kg): --  REVIEW OF SYSTEMS:    CONSTITUTIONAL: No weakness, fevers or chills  EYES/ENT: No visual changes;  No vertigo or throat pain   NECK: No pain or stiffness  RESPIRATORY: No cough, wheezing, hemoptysis; No shortness of breath  CARDIOVASCULAR: No chest pain or palpitations  GASTROINTESTINAL: No abdominal or epigastric pain. No nausea, vomiting, or hematemesis; No diarrhea or constipation. No melena or hematochezia.  GENITOURINARY: No dysuria, frequency or hematuria  NEUROLOGICAL: No numbness or weakness  SKIN: No itching, burning, rashes, or lesions   All other review of systems is negative unless indicated above.  PHYSICAL EXAM:  GENERAL: NAD, well-groomed, well-developed  HEAD:  Atraumatic, Normocephalic  EYES: EOMI, PERRLA, conjunctiva and sclera clear  ENMT: No tonsillar erythema, exudates, or enlargement; Moist mucous membranes, Good dentition, No lesions  NECK: Supple, No JVD, Normal thyroid  NERVOUS SYSTEM:  Alert & Oriented X3, Good concentration; Motor Strength 5/5 B/L upper and lower extremities; DTRs 2+ intact and symmetric  CHEST/LUNG: Clear to auscultation bilaterally; No rales, rhonchi, wheezing, or rubs  HEART: Regular rate and rhythm; No murmurs, rubs, or gallops  ABDOMEN: Soft, Nontender, Nondistended; Bowel sounds present      Time spent 70 minutes.   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     New onset afib - likely precipitated by the caffeine   - Echo with normal LV systolic function, no significant valve issues  - TSH normal  - s/p  diltiazem drip  - Start diltiazem  mg daily  - s/p digoxin 0.5 mg IVP now then 0.25 mg IVP q6h x2 doses.    Digoxin  0.25 mg PO daily  - Continue apixaban 5 mg bid  He will likely need to follow-up with EP as outpatient for consideration of DCCV vs. ablation if he remains in AF.      T(C): 36.4 (10-05-21 @ 04:06), Max: 36.7 (10-04-21 @ 21:19)  HR: 74 (10-05-21 @ 10:00) (70 - 137)  BP: 113/72 (10-05-21 @ 10:00) (102/69 - 134/84)  RR: 27 (10-05-21 @ 10:00) (8 - 30)  SpO2: 99% (10-05-21 @ 10:00) (95% - 100%)  Wt(kg): --  REVIEW OF SYSTEMS:    CONSTITUTIONAL: No weakness, fevers or chills  EYES/ENT: No visual changes;  No vertigo or throat pain   NECK: No pain or stiffness  RESPIRATORY: No cough, wheezing, hemoptysis; No shortness of breath  CARDIOVASCULAR: No chest pain or palpitations  GASTROINTESTINAL: No abdominal or epigastric pain. No nausea, vomiting, or hematemesis; No diarrhea or constipation. No melena or hematochezia.  GENITOURINARY: No dysuria, frequency or hematuria  NEUROLOGICAL: No numbness or weakness  SKIN: No itching, burning, rashes, or lesions   All other review of systems is negative unless indicated above.  PHYSICAL EXAM:  GENERAL: NAD, well-groomed, well-developed  HEAD:  Atraumatic, Normocephalic  EYES: EOMI, PERRLA, conjunctiva and sclera clear  ENMT: No tonsillar erythema, exudates, or enlargement; Moist mucous membranes, Good dentition, No lesions  NECK: Supple, No JVD, Normal thyroid  NERVOUS SYSTEM:  Alert & Oriented X3, Good concentration; Motor Strength 5/5 B/L upper and lower extremities; DTRs 2+ intact and symmetric  CHEST/LUNG: Clear to auscultation bilaterally; No rales, rhonchi, wheezing, or rubs  HEART: Regular rate and rhythm; No murmurs, rubs, or gallops  ABDOMEN: Soft, Nontender, Nondistended; Bowel sounds present      Time spent 70 minutes.

## 2021-10-04 NOTE — DISCHARGE NOTE PROVIDER - CARE PROVIDER_API CALL
Ric Umana)  Cardiovascular Disease; Internal Medicine  [address] [facility] [address]  [place] [address]  Phone: [phone]  Fax: [phone]  Follow Up Time:

## 2021-10-04 NOTE — PROGRESS NOTE ADULT - ASSESSMENT
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     New onset afib - likely precipitated by the caffeine   - Echo with normal LV systolic function, no significant valve issues  - TSH normal  - Discontinue diltiazem drip  - Start diltiazem  mg daily  - BP on low side and tachycardic still - add digoxin 0.5 mg IVP now then 0.25 mg IVP q6h x2 doses, then 0.25 mg PO daily  - Continue apixaban 5 mg bid  - If patient better rate controlled by tomorrow, possible discharge at that time. He will likely need to follow-up with EP as outpatient for consideration of DCCV vs. ablation if he remains in AF.      Preventive measures  eliquse.    Plan of care was discuss with patient, all questions were answered, seems understand and in agreement.

## 2021-10-04 NOTE — PROGRESS NOTE ADULT - ASSESSMENT
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    got xanax overnight -  vs noted -   TSH noted  TTE reviewed      Afib management - cardizem gtt  SPCU monitoring  serial CE    monitor VS and HD and Sat  D dimer normal    Cardio eval in progress  pulse ox - cardiac tele monitor  spoke with pt - discussed plan of care and findings  follows with [facility] Rad On[facility] and Med Onc

## 2021-10-04 NOTE — PROGRESS NOTE ADULT - ASSESSMENT
The patient is a 71 year old male with a history of vocal cord lesion s/p radiation therapy, HL who presents with tachycardia in the setting of rapid atrial fibrillation.    Plan:  - Echo with normal LV systolic function, no significant valve issues  - TSH normal  - Discontinue diltiazem drip  - Start diltiazem  mg daily  - BP on low side and tachycardic still - add digoxin 0.5 mg IVP now then 0.25 mg IVP q6h x2 doses, then 0.25 mg PO daily  - Continue apixaban 5 mg bid  - If patient better rate controlled by tomorrow, possible discharge at that time. He will likely need to follow-up with EP as outpatient for consideration of DCCV vs. ablation if he remains in AF.

## 2021-10-04 NOTE — DISCHARGE NOTE PROVIDER - NSDCCPCAREPLAN_GEN_ALL_CORE_FT
PRINCIPAL DISCHARGE DIAGNOSIS  Diagnosis: New onset atrial fibrillation  Assessment and Plan of Treatment: New onset afib - likely precipitated by the caffeine   - Echo with normal LV systolic function, no significant valve issues  - TSH normal  - Discontinue diltiazem drip  - Start diltiazem  mg daily  - s/p digoxin 0.5 mg IVP now then 0.25 mg IVP q6h x2 doses.   0.25 mg PO daily  - Continue apixaban 5 mg bid  He will likely need to follow-up with EP as outpatient for consideration of DCCV vs. ablation if he remains in AF.       PRINCIPAL DISCHARGE DIAGNOSIS  Diagnosis: New onset atrial fibrillation  Assessment and Plan of Treatment: New onset afib - likely precipitated by the caffeine   - Echo with normal LV systolic function, no significant valve issues  - TSH normal  -s/p diltiazem drip  - Start diltiazem CD 360mg daily  - s/p digoxin 0.5 mg IVP now then 0.25 mg IVP q6h x2 doses.   0.25 mg PO daily  - Continue apixaban 5 mg bid  He will likely need to follow-up with EP as outpatient for consideration of DCCV vs. ablation if he remains in AF.

## 2021-10-04 NOTE — DISCHARGE NOTE PROVIDER - NSDCMRMEDTOKEN_GEN_ALL_CORE_FT
apixaban 5 mg oral tablet: 1 tab(s) orally every 12 hours  digoxin 250 mcg (0.25 mg) oral tablet: 1 tab(s) orally once a day  dilTIAZem 240 mg/24 hours oral capsule, extended release: 1 cap(s) orally once a day  Vitamin D3 2000 intl units (50 mcg) oral tablet: 1 tab(s) orally once a day   acetaminophen 325 mg oral tablet: 2 tab(s) orally every 6 hours, As needed, Temp greater or equal to 38C (100.4F), Mild Pain (1 - 3)  aluminum hydroxide-magnesium hydroxide 200 mg-200 mg/5 mL oral suspension: 30 milliliter(s) orally every 4 hours, As needed, Dyspepsia  apixaban 5 mg oral tablet: 1 tab(s) orally every 12 hours  digoxin 250 mcg (0.25 mg) oral tablet: 1 tab(s) orally once a day  dilTIAZem 360 mg/24 hours oral capsule, extended release: 1 cap(s) orally once a day  melatonin 3 mg oral tablet: 1 tab(s) orally once a day (at bedtime), As needed, Insomnia  Vitamin D3 2000 intl units (50 mcg) oral tablet: 1 tab(s) orally once a day

## 2021-10-05 ENCOUNTER — TRANSCRIPTION ENCOUNTER (OUTPATIENT)
Age: 71
End: 2021-10-05

## 2021-10-05 VITALS
HEART RATE: 84 BPM | TEMPERATURE: 98 F | DIASTOLIC BLOOD PRESSURE: 81 MMHG | SYSTOLIC BLOOD PRESSURE: 116 MMHG | RESPIRATION RATE: 17 BRPM | OXYGEN SATURATION: 99 %

## 2021-10-05 PROCEDURE — 99239 HOSP IP/OBS DSCHRG MGMT >30: CPT

## 2021-10-05 RX ORDER — LANOLIN ALCOHOL/MO/W.PET/CERES
1 CREAM (GRAM) TOPICAL
Qty: 0 | Refills: 0 | DISCHARGE
Start: 2021-10-05

## 2021-10-05 RX ORDER — ACETAMINOPHEN 500 MG
2 TABLET ORAL
Qty: 0 | Refills: 0 | DISCHARGE
Start: 2021-10-05

## 2021-10-05 RX ORDER — DILTIAZEM HCL 120 MG
1 CAPSULE, EXT RELEASE 24 HR ORAL
Qty: 0 | Refills: 0 | DISCHARGE
Start: 2021-10-05

## 2021-10-05 RX ADMIN — Medication 250 MICROGRAM(S): at 05:51

## 2021-10-05 RX ADMIN — Medication 360 MILLIGRAM(S): at 05:51

## 2021-10-05 RX ADMIN — APIXABAN 5 MILLIGRAM(S): 2.5 TABLET, FILM COATED ORAL at 05:51

## 2021-10-05 NOTE — PROGRESS NOTE ADULT - SUBJECTIVE AND OBJECTIVE BOX
Date/Time Patient Seen:  		  Referring MD:   Data Reviewed	       Patient is a 71y old  Male who presents with a chief complaint of afib (03 Oct 2021 13:08)      Subjective/HPI     PAST MEDICAL & SURGICAL HISTORY:  Lesion of vocal cord    Hyperlipemia    Colonic polyp    Factor II deficiency    Hemorrhoids    Throat cancer    Localized cancer of throat    Localized cancer of throat    No significant past surgical history    History of left cataract extraction    H/O cataract extraction  right eye    S/P right knee arthroscopy          Medication list         MEDICATIONS  (STANDING):  apixaban 5 milliGRAM(s) Oral every 12 hours  diltiazem    Tablet 30 milliGRAM(s) Oral every 6 hours  diltiazem Infusion 10 mG/Hr (10 mL/Hr) IV Continuous <Continuous>  influenza   Vaccine 0.5 milliLiter(s) IntraMuscular once    MEDICATIONS  (PRN):  acetaminophen   Tablet .. 650 milliGRAM(s) Oral every 6 hours PRN Temp greater or equal to 38C (100.4F), Mild Pain (1 - 3)  aluminum hydroxide/magnesium hydroxide/simethicone Suspension 30 milliLiter(s) Oral every 4 hours PRN Dyspepsia  melatonin 3 milliGRAM(s) Oral at bedtime PRN Insomnia  ondansetron Injectable 4 milliGRAM(s) IV Push every 8 hours PRN Nausea and/or Vomiting         Vitals log        ICU Vital Signs Last 24 Hrs  T(C): 36.5 (04 Oct 2021 05:00), Max: 36.9 (03 Oct 2021 08:49)  T(F): 97.7 (04 Oct 2021 05:00), Max: 98.5 (03 Oct 2021 08:49)  HR: 109 (04 Oct 2021 05:52) (72 - 110)  BP: 107/79 (04 Oct 2021 05:52) (90/63 - 128/69)  BP(mean): 88 (04 Oct 2021 05:52) (74 - 112)  ABP: --  ABP(mean): --  RR: 20 (04 Oct 2021 05:52) (15 - 24)  SpO2: 98% (04 Oct 2021 05:52) (96% - 100%)           Input and Output:  I&O's Detail    03 Oct 2021 07:01  -  04 Oct 2021 06:47  --------------------------------------------------------  IN:    Diltiazem: 85 mL    Oral Fluid: 860 mL  Total IN: 945 mL    OUT:    Voided (mL): 3600 mL  Total OUT: 3600 mL    Total NET: -2655 mL          Lab Data                        16.6   6.59  )-----------( 172      ( 03 Oct 2021 04:38 )             48.0     10-03    138  |  106  |  25<H>  ----------------------------<  122<H>  4.3   |  26  |  0.91    Ca    8.4      03 Oct 2021 04:38  Mg     2.1     10-03        CARDIAC MARKERS ( 03 Oct 2021 04:38 )  .005 ng/mL / x     / x     / x     / x            Review of Systems	      Objective     Physical Examination    heart s1s2  lung dec BS  abd soft  head nc  verbal      Pertinent Lab findings & Imaging      Poli:  NO   Adequate UO     I&O's Detail    03 Oct 2021 07:01  -  04 Oct 2021 06:47  --------------------------------------------------------  IN:    Diltiazem: 85 mL    Oral Fluid: 860 mL  Total IN: 945 mL    OUT:    Voided (mL): 3600 mL  Total OUT: 3600 mL    Total NET: -2655 mL               Discussed with:     Cultures:	        Radiology                            
PCP: Liudmila Herrera MD    Last appt: Visit date not found  Future Appointments   Date Time Provider Abel Acosta   12/24/2020  8:00 AM LAB [facility] BS AMB       Requested Prescriptions     Pending Prescriptions Disp Refills    amLODIPine (NORVASC) 10 mg tablet 30 Tab 0       Prior labs and Blood pressures:  BP Readings from Last 3 Encounters:   10/13/20 (!) 170/90   10/12/20 (!) 170/90   09/28/20 (!) 146/86     Lab Results   Component Value Date/Time    Sodium 142 07/29/2019 09:55 AM    Potassium 4.3 07/29/2019 09:55 AM    Chloride 104 07/29/2019 09:55 AM    CO2 24 07/29/2019 09:55 AM    Anion gap 6 03/08/2012 04:45 AM    Glucose 89 07/29/2019 09:55 AM    BUN 12 07/29/2019 09:55 AM    Creatinine 0.76 07/29/2019 09:55 AM    BUN/Creatinine ratio 16 07/29/2019 09:55 AM    GFR est  07/29/2019 09:55 AM    GFR est non-AA 87 07/29/2019 09:55 AM    Calcium 9.4 07/29/2019 09:55 AM     No results found for: HBA1C, HGBE8, PDV4IBHP, PZR6SVOC  Lab Results   Component Value Date/Time    Cholesterol, total 252 (H) 07/29/2019 09:55 AM    HDL Cholesterol 71 07/29/2019 09:55 AM    LDL, calculated 172 (H) 07/29/2019 09:55 AM    VLDL, calculated 9 07/29/2019 09:55 AM    Triglyceride 43 07/29/2019 09:55 AM    CHOL/HDL Ratio 3.7 11/11/2010 10:33 AM     Lab Results   Component Value Date/Time    Vitamin D 25-Hydroxy 28.3 (L) 09/13/2011 09:27 AM    VITAMIN D, 25-HYDROXY 42.4 07/29/2019 09:55 AM       Lab Results   Component Value Date/Time    TSH 1.330 07/29/2019 09:55 AM
Patient is a 71y old  Male who presents with a chief complaint of afib (03 Oct 2021 05:28)      BRIEF HOSPITAL COURSE: 71 year old male PMHx Vocal Cord Mass ( s/p XRT Last 2 months ago - Pt claims resolution), HLD,. Never Smoker.  Admitted 10/3/2021 after feeling his heart racing attempting to sleep.  Patient denies any associated CP, SOB or dizziness.  Patient attributing episode to caffeine intake today which he normally does not use.  Pateint in normal state of health prior to tonight and denies any fevers or chills, URI or UTI symptoms.  Denies Diarrhea N/V or abdominal complaints.      Events last 24 hours: In ED -150 AF w/ RVR,  Placed on Cardizem Gtt in ED for rate control.    PAST MEDICAL & SURGICAL HISTORY:  Lesion of vocal cord  Hyperlipemia  Colonic polyp  Factor II deficiency  Hemorrhoids  Throat cancer  Localized cancer of throat  Localized cancer of throat  History of left cataract extraction  H/O cataract extraction  right eye  S/P right knee arthroscopy        Review of Systems:  CONSTITUTIONAL: No fever, chills, or fatigue  EYES: No eye pain, visual disturbances, or discharge  ENMT:  No difficulty hearing, tinnitus, vertigo; No sinus or throat pain  NECK: No pain or stiffness  RESPIRATORY: No cough, wheezing, chills or hemoptysis; No shortness of breath  CARDIOVASCULAR: No chest pain, palpitations, dizziness, or leg swelling  GASTROINTESTINAL: No abdominal or epigastric pain. No nausea, vomiting, or hematemesis; No diarrhea or constipation. No melena or hematochezia.  GENITOURINARY: No dysuria, frequency, hematuria, or incontinence  NEUROLOGICAL: No headaches, memory loss, loss of strength, numbness, or tremors  SKIN: No itching, burning, rashes, or lesions   MUSCULOSKELETAL: No joint pain or swelling; No muscle, back, or extremity pain  PSYCHIATRIC: No depression, anxiety, mood swings, or difficulty sleeping      Medications:  diltiazem Infusion 10 mG/Hr IV Continuous <Continuous>  acetaminophen   Tablet .. 650 milliGRAM(s) Oral every 6 hours PRN  melatonin 3 milliGRAM(s) Oral at bedtime PRN  ondansetron Injectable 4 milliGRAM(s) IV Push every 8 hours PRN  enoxaparin Injectable 90 milliGRAM(s) SubCutaneous two times a day  aluminum hydroxide/magnesium hydroxide/simethicone Suspension 30 milliLiter(s) Oral every 4 hours PRN            ICU Vital Signs Last 24 Hrs  T(C): 36.4 (03 Oct 2021 04:00), Max: 36.4 (03 Oct 2021 04:00)  T(F): 97.5 (03 Oct 2021 04:00), Max: 97.5 (03 Oct 2021 04:00)  HR: 127 (03 Oct 2021 05:29) (114 - 140)  BP: 119/75 (03 Oct 2021 05:29) (96/79 - 128/86)  RR: 18 (03 Oct 2021 05:29) (16 - 18)  SpO2: 96% (03 Oct 2021 05:29) (96% - 98%)              LABS:                        16.6   6.59  )-----------( 172      ( 03 Oct 2021 04:38 )             48.0     10-03    138  |  106  |  25<H>  ----------------------------<  122<H>  4.3   |  26  |  0.91    Ca    8.4      03 Oct 2021 04:38  Mg     2.1     10-03        CARDIAC MARKERS ( 03 Oct 2021 04:38 )  .005 ng/mL / x     / x     / x     / x          CAPILLARY BLOOD GLUCOSE              Physical Examination:    General:  Awake.  Alert, oriented, interactive, nonfocal    HEENT: Pupils equal, reactive to light.  Symmetric. No JVD.    PULM: Clear to auscultation bilaterally, no significant sputum production    CVS: S1 S2 Irr / Tachy  no murmurs, rubs, or gallops    ABD: Soft, nondistended, nontender, normoactive bowel sounds, no masses    EXT: No edema, nontender    SKIN: Warm and well perfused, no rashes noted.          CRITICAL CARE TIME SPENT: 35 minutes   (Assessing presenting problems of acute illness, which pose high probability of life threatening deterioration or end organ damage/dysfunction, as well as medical decision making including initiating plan of care, reviewing data, reviewing radiologic exams, discussing with multidisciplinary team,  discussing goals of care with patient/family, and writing this note.  Non-inclusive of procedures performed)  
Chief Complaint: Tachycardia    Interval Events: No events overnight. Some lightheadedness this morning.    Review of Systems:  General: No fevers, chills, weight loss or gain  Skin: No rashes, color changes  Cardiovascular: No chest pain, orthopnea  Respiratory: No shortness of breath, cough  Gastrointestinal: No nausea, abdominal pain  Genitourinary: No incontinence, pain with urination  Musculoskeletal: No pain, swelling, decreased range of motion  Neurological: No headache, weakness  Psychiatric: No depression, anxiety  Endocrine: No weight loss or gain, increased thirst  All other systems are comprehensively negative.    Physical Exam:  Vital Signs Last 24 Hrs  T(C): 36.4 (05 Oct 2021 04:06), Max: 36.7 (04 Oct 2021 21:19)  T(F): 97.6 (05 Oct 2021 04:06), Max: 98 (04 Oct 2021 21:19)  HR: 103 (05 Oct 2021 07:00) (70 - 137)  BP: 115/69 (05 Oct 2021 07:00) (89/60 - 134/84)  BP(mean): 82 (05 Oct 2021 07:00) (70 - 114)  RR: 25 (05 Oct 2021 07:00) (8 - 30)  SpO2: 97% (05 Oct 2021 07:00) (95% - 100%)  General: NAD  HEENT: MMM  Neck: No JVD, no carotid bruit  Lungs: CTAB  CV: Irregular, nl S1/S2, no M/R/G  Abdomen: S/NT/ND, +BS  Extremities: No LE edema, no cyanosis  Neuro: AAOx3, non-focal  Skin: No rash    Labs:                                   16.6   7.05  )-----------( 164      ( 04 Oct 2021 06:48 )             47.7       Telemetry: AF
Chief Complaint: Tachycardia    Interval Events: Tachycardic at times overnight. BP on low side at times.    Review of Systems:  General: No fevers, chills, weight loss or gain  Skin: No rashes, color changes  Cardiovascular: No chest pain, orthopnea  Respiratory: No shortness of breath, cough  Gastrointestinal: No nausea, abdominal pain  Genitourinary: No incontinence, pain with urination  Musculoskeletal: No pain, swelling, decreased range of motion  Neurological: No headache, weakness  Psychiatric: No depression, anxiety  Endocrine: No weight loss or gain, increased thirst  All other systems are comprehensively negative.    Physical Exam:  Vitals:        Vital Signs Last 24 Hrs  T(C): 36.4 (04 Oct 2021 08:30), Max: 36.7 (03 Oct 2021 12:00)  T(F): 97.5 (04 Oct 2021 08:30), Max: 98.1 (03 Oct 2021 12:00)  HR: 71 (04 Oct 2021 10:00) (71 - 109)  BP: 89/60 (04 Oct 2021 10:00) (89/60 - 128/69)  BP(mean): 70 (04 Oct 2021 10:00) (70 - 112)  RR: 20 (04 Oct 2021 10:00) (15 - 24)  SpO2: 98% (04 Oct 2021 10:00) (96% - 100%)  General: NAD  HEENT: MMM  Neck: No JVD, no carotid bruit  Lungs: CTAB  CV: Irregular, nl S1/S2, no M/R/G  Abdomen: S/NT/ND, +BS  Extremities: No LE edema, no cyanosis  Neuro: AAOx3, non-focal  Skin: No rash    Labs:                        16.6   7.05  )-----------( 164      ( 04 Oct 2021 06:48 )             47.7     10-03    138  |  106  |  25<H>  ----------------------------<  122<H>  4.3   |  26  |  0.91    Ca    8.4      03 Oct 2021 04:38  Mg     2.1     10-03      CARDIAC MARKERS ( 03 Oct 2021 04:38 )  .005 ng/mL / x     / x     / x     / x              Telemetry: AF
Date/Time Patient Seen:  		  Referring MD:   Data Reviewed	       Patient is a 71y old  Male who presents with a chief complaint of afib (04 Oct 2021 16:11)      Subjective/HPI     PAST MEDICAL & SURGICAL HISTORY:  Lesion of vocal cord    Hyperlipemia    Colonic polyp    Factor II deficiency    Hemorrhoids    Throat cancer    Localized cancer of throat    Localized cancer of throat    No significant past surgical history    History of left cataract extraction    H/O cataract extraction  right eye    S/P right knee arthroscopy          Medication list         MEDICATIONS  (STANDING):  apixaban 5 milliGRAM(s) Oral every 12 hours  digoxin     Tablet 250 MICROGram(s) Oral daily  diltiazem    milliGRAM(s) Oral daily    MEDICATIONS  (PRN):  acetaminophen   Tablet .. 650 milliGRAM(s) Oral every 6 hours PRN Temp greater or equal to 38C (100.4F), Mild Pain (1 - 3)  aluminum hydroxide/magnesium hydroxide/simethicone Suspension 30 milliLiter(s) Oral every 4 hours PRN Dyspepsia  melatonin 3 milliGRAM(s) Oral at bedtime PRN Insomnia  ondansetron Injectable 4 milliGRAM(s) IV Push every 8 hours PRN Nausea and/or Vomiting         Vitals log        ICU Vital Signs Last 24 Hrs  T(C): 36.4 (05 Oct 2021 04:06), Max: 36.7 (04 Oct 2021 21:19)  T(F): 97.6 (05 Oct 2021 04:06), Max: 98 (04 Oct 2021 21:19)  HR: 103 (05 Oct 2021 07:00) (70 - 137)  BP: 115/69 (05 Oct 2021 07:00) (102/69 - 134/84)  BP(mean): 82 (05 Oct 2021 07:00) (80 - 114)  ABP: --  ABP(mean): --  RR: 25 (05 Oct 2021 07:00) (8 - 30)  SpO2: 97% (05 Oct 2021 07:00) (95% - 100%)           Input and Output:  I&O's Detail    04 Oct 2021 07:01  -  05 Oct 2021 07:00  --------------------------------------------------------  IN:    Oral Fluid: 420 mL  Total IN: 420 mL    OUT:    Voided (mL): 1750 mL  Total OUT: 1750 mL    Total NET: -1330 mL          Lab Data                        16.6   7.05  )-----------( 164      ( 04 Oct 2021 06:48 )             47.7                   Review of Systems	      Objective     Physical Examination    heart s1s2  lung dec BS  abd soft  head nc  verbal  alert      Pertinent Lab findings & Imaging      Poli:  NO   Adequate UO     I&O's Detail    04 Oct 2021 07:01  -  05 Oct 2021 07:00  --------------------------------------------------------  IN:    Oral Fluid: 420 mL  Total IN: 420 mL    OUT:    Voided (mL): 1750 mL  Total OUT: 1750 mL    Total NET: -1330 mL               Discussed with:     Cultures:	        Radiology                            
Patient is a 71y old  Male who presents with a chief complaint of afib (04 Oct 2021 06:47)      INTERVAL HPI/OVERNIGHT EVENTS:  Pt is seen and examined.  feels ok. No new complaints.    Pain Location & Control:     MEDICATIONS  (STANDING):  apixaban 5 milliGRAM(s) Oral every 12 hours  digoxin  Injectable 250 MICROGram(s) IV Push every 6 hours  diltiazem    milliGRAM(s) Oral daily  influenza   Vaccine 0.5 milliLiter(s) IntraMuscular once    MEDICATIONS  (PRN):  acetaminophen   Tablet .. 650 milliGRAM(s) Oral every 6 hours PRN Temp greater or equal to 38C (100.4F), Mild Pain (1 - 3)  aluminum hydroxide/magnesium hydroxide/simethicone Suspension 30 milliLiter(s) Oral every 4 hours PRN Dyspepsia  melatonin 3 milliGRAM(s) Oral at bedtime PRN Insomnia  ondansetron Injectable 4 milliGRAM(s) IV Push every 8 hours PRN Nausea and/or Vomiting      Allergies    No Known Allergies    Intolerances      Vital Signs Last 24 Hrs  T(C): 36.4 (04 Oct 2021 08:30), Max: 36.7 (03 Oct 2021 12:00)  T(F): 97.5 (04 Oct 2021 08:30), Max: 98.1 (03 Oct 2021 12:00)  HR: 71 (04 Oct 2021 10:00) (71 - 109)  BP: 89/60 (04 Oct 2021 10:00) (89/60 - 128/69)  BP(mean): 70 (04 Oct 2021 10:00) (70 - 112)  RR: 20 (04 Oct 2021 10:00) (15 - 24)  SpO2: 98% (04 Oct 2021 10:00) (96% - 100%)        LABS:                        16.6   7.05  )-----------( 164      ( 04 Oct 2021 06:48 )             47.7       Ca    8.4        03 Oct 2021 04:38          CAPILLARY BLOOD GLUCOSE        CARDIAC MARKERS ( 03 Oct 2021 04:38 )  .005 ng/mL / x     / x     / x     / x          Cultures          RADIOLOGY & ADDITIONAL TESTS:    Imaging Personally Reviewed:  [ ] YES  [ ] NO    Consultant(s) Notes Reviewed:  [ ] YES  [ ] NO    Care Discussed with Consultants/Other Providers [x ] YES  [ ] NO
Patient is a 71y old  Male who presents with a chief complaint of afib (03 Oct 2021 06:36)      INTERVAL HPI/OVERNIGHT EVENTS:  Pt is seen and examined.  feels ok.    Pain Location & Control:     MEDICATIONS  (STANDING):  apixaban 5 milliGRAM(s) Oral every 12 hours  diltiazem    Tablet 30 milliGRAM(s) Oral every 6 hours  diltiazem Infusion 10 mG/Hr (10 mL/Hr) IV Continuous <Continuous>  influenza   Vaccine 0.5 milliLiter(s) IntraMuscular once    MEDICATIONS  (PRN):  acetaminophen   Tablet .. 650 milliGRAM(s) Oral every 6 hours PRN Temp greater or equal to 38C (100.4F), Mild Pain (1 - 3)  aluminum hydroxide/magnesium hydroxide/simethicone Suspension 30 milliLiter(s) Oral every 4 hours PRN Dyspepsia  melatonin 3 milliGRAM(s) Oral at bedtime PRN Insomnia  ondansetron Injectable 4 milliGRAM(s) IV Push every 8 hours PRN Nausea and/or Vomiting      Allergies    No Known Allergies    Intolerances            Vital Signs Last 24 Hrs  T(C): 36.6 (03 Oct 2021 09:08), Max: 36.9 (03 Oct 2021 08:49)  T(F): 97.9 (03 Oct 2021 09:08), Max: 98.5 (03 Oct 2021 08:49)  HR: 84 (03 Oct 2021 10:00) (84 - 140)  BP: 100/70 (03 Oct 2021 10:00) (93/53 - 128/86)  BP(mean): 80 (03 Oct 2021 10:00) (78 - 80)  RR: 18 (03 Oct 2021 10:00) (16 - 20)  SpO2: 97% (03 Oct 2021 10:00) (96% - 99%)        LABS:                        16.6   6.59  )-----------( 172      ( 03 Oct 2021 04:38 )             48.0     03 Oct 2021 04:38    138    |  106    |  25     ----------------------------<  122    4.3     |  26     |  0.91     Ca    8.4        03 Oct 2021 04:38  Mg     2.1       03 Oct 2021 04:38          CAPILLARY BLOOD GLUCOSE        CARDIAC MARKERS ( 03 Oct 2021 04:38 )  .005 ng/mL / x     / x     / x     / x          Cultures          RADIOLOGY & ADDITIONAL TESTS:    Imaging Personally Reviewed:  [ ] YES  [ ] NO    Consultant(s) Notes Reviewed:  [ ] YES  [ ] NO    Care Discussed with Consultants/Other Providers [x ] YES  [ ] NO

## 2021-10-05 NOTE — DISCHARGE NOTE NURSING/CASE MANAGEMENT/SOCIAL WORK - PATIENT PORTAL LINK FT
You can access the FollowMyHealth Patient Portal offered by [facility] by registering at the following website: [email or web address]. By joining [employer]’s FollowMyHealth portal, you will also be able to view your health information using other applications (apps) compatible with our system.

## 2021-10-05 NOTE — PROGRESS NOTE ADULT - ASSESSMENT
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    vs noted -   TSH noted  TTE reviewed      Afib management - cardizem and digoxin  SPCU monitoring  serial CE noted    monitor VS and HD and Sat  D dimer normal    Cardio eval reviewed  pulse ox - cardiac tele monitor  spoke with pt - discussed plan of care and findings  follows with [facility] Rad Onc and Med Onc

## 2021-10-05 NOTE — PROGRESS NOTE ADULT - ASSESSMENT
The patient is a 71 year old male with a history of vocal cord lesion s/p radiation therapy, HL who presents with tachycardia in the setting of rapid atrial fibrillation.    Plan:  - Echo with normal LV systolic function, no significant valve issues  - TSH normal  - Continue diltiazem  mg daily  - Loaded with digoxin. Continue digoxin 0.25 mg daily.  - Continue apixaban 5 mg bid  - On average, HR is 80-100s while at rest. Allow for HR to go to 130-140s with exertion  - Ambulate this morning. If no lightheadedness, ok for discharge from a cardiac standpoint. He will need close outpatient follow-up and referral to EP if he remains in AF.

## 2021-10-26 PROCEDURE — 71046 X-RAY EXAM CHEST 2 VIEWS: CPT

## 2021-10-26 PROCEDURE — 93005 ELECTROCARDIOGRAM TRACING: CPT

## 2021-10-26 PROCEDURE — 83735 ASSAY OF MAGNESIUM: CPT

## 2021-10-26 PROCEDURE — 85027 COMPLETE CBC AUTOMATED: CPT

## 2021-10-26 PROCEDURE — 84484 ASSAY OF TROPONIN QUANT: CPT

## 2021-10-26 PROCEDURE — 85379 FIBRIN DEGRADATION QUANT: CPT

## 2021-10-26 PROCEDURE — 93306 TTE W/DOPPLER COMPLETE: CPT

## 2021-10-26 PROCEDURE — 86769 SARS-COV-2 COVID-19 ANTIBODY: CPT

## 2021-10-26 PROCEDURE — 36415 COLL VENOUS BLD VENIPUNCTURE: CPT

## 2021-10-26 PROCEDURE — 85025 COMPLETE CBC W/AUTO DIFF WBC: CPT

## 2021-10-26 PROCEDURE — 87635 SARS-COV-2 COVID-19 AMP PRB: CPT

## 2021-10-26 PROCEDURE — 80048 BASIC METABOLIC PNL TOTAL CA: CPT

## 2021-10-26 PROCEDURE — 96374 THER/PROPH/DIAG INJ IV PUSH: CPT

## 2021-10-26 PROCEDURE — 84443 ASSAY THYROID STIM HORMONE: CPT

## 2021-10-26 PROCEDURE — 86803 HEPATITIS C AB TEST: CPT

## 2021-10-26 PROCEDURE — 99285 EMERGENCY DEPT VISIT HI MDM: CPT

## 2021-11-08 ENCOUNTER — APPOINTMENT (OUTPATIENT)
Dept: OTOLARYNGOLOGY | Facility: CLINIC | Age: 71
End: 2021-11-08
Payer: MEDICARE

## 2021-11-08 VITALS
TEMPERATURE: 98.7 F | HEART RATE: 96 BPM | SYSTOLIC BLOOD PRESSURE: 124 MMHG | OXYGEN SATURATION: 98 % | DIASTOLIC BLOOD PRESSURE: 86 MMHG | HEIGHT: 73 IN | WEIGHT: 210 LBS | BODY MASS INDEX: 27.83 KG/M2

## 2021-11-08 DIAGNOSIS — Z92.3 PERSONAL HISTORY OF IRRADIATION: ICD-10-CM

## 2021-11-08 DIAGNOSIS — I48.91 UNSPECIFIED ATRIAL FIBRILLATION: ICD-10-CM

## 2021-11-08 PROCEDURE — 99214 OFFICE O/P EST MOD 30 MIN: CPT | Mod: 25

## 2021-11-08 PROCEDURE — 31575 DIAGNOSTIC LARYNGOSCOPY: CPT

## 2021-11-08 NOTE — CONSULT LETTER
[Dear  ___] : Dear  [unfilled], [Courtesy Letter:] : I had the pleasure of seeing your patient, [unfilled], in my office today. [Please see my note below.] : Please see my note below. [Consult Closing:] : Thank you very much for allowing me to participate in the care of this patient.  If you have any questions, please do not hesitate to contact me. [Sincerely,] : Sincerely, [FreeTextEntry2] : Rg Fry MD ([place], [place])\par  [FreeTextEntry3] : Ant Denson MD, FACS\par Chief of Otolaryngology [facility]\par  - Dept. of Otolaryngology\par [employer]\par \par  [DrKike  ___] : Dr. FERNANDO

## 2021-11-08 NOTE — HISTORY OF PRESENT ILLNESS
[de-identified] : Mr. MELARA is a 71 year male who presents s/p RT with Dr. Patterson on August 4, 2021 for SCCa larynx T1N0M0.  He reports that he is doing well and is without any complaints.  He is currently 3 months out of treatment.  He also reports that he has been feeling increased fatigue since finishing RT.  Denies any pain or discomfort and is tolerating a regular diet.\par He also reports developing afib after radiation and is currently on Eliquis. [Neck Mass] : no neck mass [Difficulty Swallowing] : no difficulty swallowing [Painful Swallowing] : no painful swallowing

## 2021-11-09 PROBLEM — C14.0 MALIGNANT NEOPLASM OF PHARYNX, UNSPECIFIED: Chronic | Status: ACTIVE | Noted: 2021-10-03

## 2021-12-20 ENCOUNTER — APPOINTMENT (OUTPATIENT)
Dept: OTOLARYNGOLOGY | Facility: CLINIC | Age: 71
End: 2021-12-20
Payer: MEDICARE

## 2021-12-20 VITALS
HEART RATE: 63 BPM | TEMPERATURE: 98.1 F | SYSTOLIC BLOOD PRESSURE: 110 MMHG | WEIGHT: 200 LBS | OXYGEN SATURATION: 97 % | HEIGHT: 73 IN | DIASTOLIC BLOOD PRESSURE: 70 MMHG | BODY MASS INDEX: 26.51 KG/M2

## 2021-12-20 PROCEDURE — 99214 OFFICE O/P EST MOD 30 MIN: CPT | Mod: 25

## 2021-12-20 PROCEDURE — 31575 DIAGNOSTIC LARYNGOSCOPY: CPT

## 2021-12-20 RX ORDER — DILTIAZEM HYDROCHLORIDE 180 MG/1
180 CAPSULE, EXTENDED RELEASE ORAL
Qty: 30 | Refills: 0 | Status: DISCONTINUED | COMMUNITY
Start: 2021-10-07 | End: 2021-12-20

## 2021-12-20 RX ORDER — APIXABAN 5 MG/1
5 TABLET, FILM COATED ORAL
Qty: 60 | Refills: 0 | Status: DISCONTINUED | COMMUNITY
Start: 2021-10-07 | End: 2021-12-20

## 2021-12-20 NOTE — CONSULT LETTER
[Dear  ___] : Dear  [unfilled], [Courtesy Letter:] : I had the pleasure of seeing your patient, [unfilled], in my office today. [Please see my note below.] : Please see my note below. [Consult Closing:] : Thank you very much for allowing me to participate in the care of this patient.  If you have any questions, please do not hesitate to contact me. [Sincerely,] : Sincerely, [FreeTextEntry2] : Rg Fry MD ([place], [place])\par  [FreeTextEntry3] : Ant Denson MD, FACS\par Chief of Otolaryngology [facility]\par  - Dept. of Otolaryngology\par [facility] [employer]\par \par  [DrKike  ___] : Dr. FERNANDO [DrKike ___] : Dr. FERNANDO

## 2021-12-20 NOTE — HISTORY OF PRESENT ILLNESS
[de-identified] : Mr. MELARA is a 71 year male who presents for his 6 week follow up with history of T1N0M0 SCCa of the larynx s/p RT with Dr. Patterson on August 4, 2021.  He is currently 4 months out of treatment.  He reports having phlegm production of which he has to frequently clear his throat.  He also reports that he is no longer taking Eliquist. [Neck Mass] : no neck mass [Difficulty Swallowing] : no difficulty swallowing [Painful Swallowing] : no painful swallowing

## 2021-12-20 NOTE — PROCEDURE
[Topical Lidocaine] : topical lidocaine [Oxymetazoline HCl] : oxymetazoline HCl [Flexible Endoscope] : examined with the flexible endoscope [Serial Number: ___] : Serial Number: [unfilled] [Normal] : the false vocal folds were pink and regular, the ventricular sulcus was open, the true vocal folds were glistening white, tense and of equal length, mobility, and height [True Vocal Cords Paralysis] : no true vocal cord paralysis [True Vocal Cords Erythematous] : no true vocal cord edema [True Vocal Cords Garces's Nodules] : no true vocal cord nodules [Glottis Arytenoid Cartilages] : no arytenoid granulomas [Glottis Arytenoid Cartilages Erythema] : no arytenoid erythema [Interarytenoid Edema] : interarytenoid area edematous

## 2022-02-11 ENCOUNTER — RESULT REVIEW (OUTPATIENT)
Age: 72
End: 2022-02-11

## 2022-02-18 ENCOUNTER — APPOINTMENT (OUTPATIENT)
Dept: CT IMAGING | Facility: IMAGING CENTER | Age: 72
End: 2022-02-18
Payer: MEDICARE

## 2022-02-18 ENCOUNTER — OUTPATIENT (OUTPATIENT)
Dept: OUTPATIENT SERVICES | Facility: HOSPITAL | Age: 72
LOS: 1 days | End: 2022-02-18
Payer: MEDICARE

## 2022-02-18 DIAGNOSIS — C32.0 MALIGNANT NEOPLASM OF GLOTTIS: ICD-10-CM

## 2022-02-18 DIAGNOSIS — Z98.42 CATARACT EXTRACTION STATUS, LEFT EYE: Chronic | ICD-10-CM

## 2022-02-18 DIAGNOSIS — Z98.49 CATARACT EXTRACTION STATUS, UNSPECIFIED EYE: Chronic | ICD-10-CM

## 2022-02-18 DIAGNOSIS — Z98.890 OTHER SPECIFIED POSTPROCEDURAL STATES: Chronic | ICD-10-CM

## 2022-02-18 PROCEDURE — 71260 CT THORAX DX C+: CPT | Mod: 26,MH

## 2022-02-18 PROCEDURE — 70491 CT SOFT TISSUE NECK W/DYE: CPT | Mod: 26,MH

## 2022-02-18 PROCEDURE — 71260 CT THORAX DX C+: CPT

## 2022-02-18 PROCEDURE — 70491 CT SOFT TISSUE NECK W/DYE: CPT

## 2022-03-17 ENCOUNTER — APPOINTMENT (OUTPATIENT)
Dept: RADIATION ONCOLOGY | Facility: CLINIC | Age: 72
End: 2022-03-17
Payer: MEDICARE

## 2022-03-17 VITALS
TEMPERATURE: 97 F | HEART RATE: 64 BPM | RESPIRATION RATE: 17 BRPM | WEIGHT: 211.75 LBS | OXYGEN SATURATION: 99 % | BODY MASS INDEX: 27.94 KG/M2 | SYSTOLIC BLOOD PRESSURE: 138 MMHG | DIASTOLIC BLOOD PRESSURE: 73 MMHG

## 2022-03-17 PROCEDURE — 99213 OFFICE O/P EST LOW 20 MIN: CPT

## 2022-03-17 RX ORDER — MELATONIN 3 MG
TABLET ORAL
Refills: 0 | Status: ACTIVE | COMMUNITY

## 2022-03-17 NOTE — PHYSICAL EXAM
[General Appearance - Well Developed] : well developed [General Appearance - Well Nourished] : well nourished [Sclera] : the sclera and conjunctiva were normal [Extraocular Movements] : extraocular movements were intact [Outer Ear] : the ears and nose were normal in appearance [Hearing Threshold Finger Rub Not [place]ocking] : hearing was normal [Exaggerated Use Of Accessory Muscles For Inspiration] : no accessory muscle use [Edema] : no peripheral edema present [Nondistended] : nondistended [Cervical Lymph Nodes Enlarged Posterior Bilaterally] : posterior cervical [Cervical Lymph Nodes Enlarged Anterior Bilaterally] : anterior cervical [Range of Motion to Joints] : range of motion to joints [Skin Color & Pigmentation] : normal skin color and pigmentation [] : no rash [No Focal Deficits] : no focal deficits [Normal] : oriented to person, place and time, the affect was normal, the mood was normal and not anxious

## 2022-03-17 NOTE — REVIEW OF SYSTEMS
[Dysphagia: Grade 0] : Dysphagia: Grade 0 [Fatigue: Grade 0] : Fatigue: Grade 0 [Mucositis Oral: Grade 0] : Mucositis Oral: Grade 0  [Xerostomia: Grade 1 - Symptomatic (e.g., dry or thick saliva) without significant dietary alteration; unstimulated saliva flow >0.2 ml/min] : Xerostomia: Grade 1 - Symptomatic (e.g., dry or thick saliva) without significant dietary alteration; unstimulated saliva flow >0.2 ml/min [Oral Pain: Grade 0] : Oral Pain: Grade 0 [Dysgeusia: Grade 0] : Dysgeusia: Grade 0 [Hoarseness: Grade 0] : Hoarseness: Grade 0 [Skin Hyperpigmentation: Grade 0] : Skin Hyperpigmentation: Grade 0 [Dermatitis Radiation: Grade 0] : Dermatitis Radiation: Grade 0

## 2022-03-23 NOTE — HISTORY OF PRESENT ILLNESS
[FreeTextEntry1] : Mr. 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  \par \par 2/18/2022 CT Neck Soft Tissue: IMPRESSION: No evidence of recurrent or metastatic disease.\par \par 2/18/2022 CT Chest: IMPRESSION: Right upper lobe 0.3 cm nodule unchanged. No new nodule.\par \par Presents today for follow up. Doing well. C/o moderate mucous production. No other concerns. Tolerating all foods. Denies new LAD, changes in voice, hoarseness, unintentional wt loss, night sweats, bone pain, etc. \par

## 2022-03-23 NOTE — DISEASE MANAGEMENT
[Clinical] : TNM Stage: c [I] : I [TTNM] : 1 [NTNM] : 0 [MTNM] : 0 [de-identified] : 28 tx/6300cGy [[place] [address]] : [id number] [de-identified] : Larynx

## 2022-05-16 ENCOUNTER — APPOINTMENT (OUTPATIENT)
Dept: OTOLARYNGOLOGY | Facility: CLINIC | Age: 72
End: 2022-05-16
Payer: MEDICARE

## 2022-05-16 VITALS
DIASTOLIC BLOOD PRESSURE: 70 MMHG | TEMPERATURE: 98.2 F | WEIGHT: 204 LBS | SYSTOLIC BLOOD PRESSURE: 110 MMHG | HEIGHT: 73 IN | OXYGEN SATURATION: 99 % | HEART RATE: 68 BPM | BODY MASS INDEX: 27.04 KG/M2

## 2022-05-16 PROCEDURE — 31575 DIAGNOSTIC LARYNGOSCOPY: CPT

## 2022-05-16 PROCEDURE — 99213 OFFICE O/P EST LOW 20 MIN: CPT | Mod: 25

## 2022-05-16 NOTE — HISTORY OF PRESENT ILLNESS
[de-identified] : Mr. MELARA is a 71 year male who presents for his 6 week follow up with history of T1N0M0 SCCa of the larynx s/p RT with Dr. Patterson on August 4, 2021. He is currently 4 months out of treatment. He reports having phlegm production of which he has to frequently clear his throat. He also reports that he is no longer taking Eliquis. \par \par Symptoms: no neck mass, no difficulty swallowing and no painful swallowing.

## 2022-05-16 NOTE — ASSESSMENT
[FreeTextEntry1] : Pt remains SUZI.  he will see Dr. Patterson in July and will see me in September.

## 2022-05-16 NOTE — CONSULT LETTER
[Dear  ___] : Dear  [unfilled], [Courtesy Letter:] : I had the pleasure of seeing your patient, [unfilled], in my office today. [Please see my note below.] : Please see my note below. [Consult Closing:] : Thank you very much for allowing me to participate in the care of this patient.  If you have any questions, please do not hesitate to contact me. [Sincerely,] : Sincerely, [FreeTextEntry2] : David Carrera MD [FreeTextEntry3] : Ant Denson MD, FACS\par Chief of Otolaryngology [facility]\par  - Dept. of Otolaryngology\par [employer]\par \par  [DrKike  ___] : Dr. FERNANDO [DrKike ___] : Dr. FERNANDO

## 2022-05-18 ENCOUNTER — APPOINTMENT (OUTPATIENT)
Dept: ORTHOPEDIC SURGERY | Facility: CLINIC | Age: 72
End: 2022-05-18
Payer: MEDICARE

## 2022-05-18 ENCOUNTER — NON-APPOINTMENT (OUTPATIENT)
Age: 72
End: 2022-05-18

## 2022-05-18 PROCEDURE — 99203 OFFICE O/P NEW LOW 30 MIN: CPT | Mod: 25

## 2022-05-18 PROCEDURE — 73030 X-RAY EXAM OF SHOULDER: CPT | Mod: LT

## 2022-05-18 PROCEDURE — 20610 DRAIN/INJ JOINT/BURSA W/O US: CPT | Mod: LT

## 2022-05-18 NOTE — HISTORY OF PRESENT ILLNESS
[de-identified] : 71 year old RHD male presents complaining of left shoulder pain. He has had pain on and off for quite some time. It last flared up 3 months ago and has not improved. He denies a specific injury but does a lot of heavy lifting in the gym. 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 He does not feel the strength or motion are limited. He cannot lie on it at night and the pain does wake him from sleep. He denies significant neck pain or numbness and tingling into his arms. He has not been treated for his shoulder in the past. He takes glucosamine/chondroitin and Vitamin D supplements. He notes that today is a good day.

## 2022-05-18 NOTE — DISCUSSION/SUMMARY
[de-identified] : I discussed the underlying pathophysiology of the patient's condition in great detail with the patient. I went over the patient's x-rays with them in great detail. I am recommending an empiric cortisone injection into the left subacromial bursa as a diagnostic test for impingement syndrome. The patient elected to receive a left shoulder subacromial cortisone injection today and tolerated it well. I instructed the patient on ROM exercises and told them to take it easy. The use of ice and rest was reviewed with the patient. The patient may resume activities tomorrow. He should avoid any heavy lifting, carrying, or overhead activities. He should not lift anything higher than 90° past his shoulder level. He should follow-up in 1 month. If his shoulder is not better the next step will be an MRI. All of his questions were answered. He understands and consents to the plan.\par \par FU in 1 month.\par after a left subacromial cortisone injection today (05/18/2022).

## 2022-05-18 NOTE — PHYSICAL EXAM
[de-identified] : Constitutional\par o Appearance : well-nourished, well developed, alert, in no acute distress \par Head and Face\par o Head :\par ¦ Inspection : atraumatic, normocephalic\par Neurologic\par o Mental Status Examination :\par ¦ Orientation : alert and oriented X 3\par Psychiatric\par o Mood and Affect: mood normal, affect appropriate \par Cardiovascular\par o Observation/Palpation : - no swelling,normal pulses, normal temperature \par Lymphatic\par o Additional Nodes : No palpable lymph nodes present \par \par Cervical Spine\par o Inspection/Palpation :\par ¦ Inspection : alignment midline, normal degree of lordosis present\par ¦ Skin : normal appearance, no masses or tenderness, trachea midline\par ¦ Palpation : musculature is nontender to palpation\par o Range of Motion : arc of motion full in all planes, no crepitance or pain with ROM\par o Tests: Negative Spurling’s test \par \par Right Upper Extremity\par o Shoulder :\par ¦ Inspection/Palpation : no tenderness, swelling or deformities\par ¦ Range of Motion : full and painless in all planes, no crepitance\par ¦ Strength : forward elevation, internal rotation, external rotation, adduction and abduction 5/5\par ¦ Stability : no joint instability on provocative testing \par ¦ Tests: Lopez negative, Neer negative, Geronimo negative, negative drop arm test \par \par Left Upper Extremity\par o Shoulder :\par ¦ Inspection/Palpation : no anterior capsular or AC joint tenderness, no swelling or deformities\par ¦ Range of Motion : full and painless in all planes, no crepitance, no pain with cross chest maneuvers \par ¦ Strength : forward elevation 5/5, internal rotation 5/5, external rotation 5/5, external rotation at 90° of abduction 5/5, supraspinatus 5/5, adduction and abduction 5/5, biceps/triceps 5/5,  strength 5/5\par ¦ Stability : no joint instability on provocative testing\par ¦ Tests: Lopez positive, Neer negative, Geronimo positive, negative drop arm test , [place]'s test negative \par \par Gait: normal gait, no significant extremity swelling or lymphedema \par \par Radiology Results \par o Left Shoulder : AP IR/ER and lateral views were obtained and reveal sclerosis of the greater tuberosity with degenerative arthritis of the AC joint, no lytic lesions. \par \par o Left Shoulder : Indication- shoulder Impingement, Anatomic location- left subacromial space, Spray - area was sterilized with Betadine and alcohol and anesthetized with Ethyl Chloride , needle used-20G, Medications given- 5cc's lidocaine, 0.5cc's kenalog, 0.5 cc's dexamethasone, and patient tolerated it well. His Lopez and Geronimo tests reverted to normal immediately after the injection.

## 2022-05-18 NOTE — ADDENDUM
[FreeTextEntry1] : I, Beto Medina, acted solely as a scribe for Dr. Damian Kohli on this date 05/18/2022.\par All medical record entries made by the Scribe were at my, Dr. Damian Kohli, direction and personally dictated by me on 05/18/2022. I have reviewed the chart and agree that the record accurately reflects my personal performance of the history, physical exam, assessment and plan. I have also personally directed, reviewed, and agreed with the chart.

## 2022-07-07 ENCOUNTER — APPOINTMENT (OUTPATIENT)
Dept: ORTHOPEDIC SURGERY | Facility: CLINIC | Age: 72
End: 2022-07-07

## 2022-07-07 PROCEDURE — 99213 OFFICE O/P EST LOW 20 MIN: CPT

## 2022-07-07 RX ORDER — ROSUVASTATIN CALCIUM 10 MG/1
10 TABLET, FILM COATED ORAL
Qty: 30 | Refills: 0 | Status: ACTIVE | COMMUNITY
Start: 2022-06-22

## 2022-07-07 NOTE — HISTORY OF PRESENT ILLNESS
[de-identified] : 71 year old RHD male presents with left shoulder pain. He does a lot of heavy lifting in the gym. 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 He does not feel the strength or motion are limited. He received a subacromial cortisone injection on 5/18/2022 and reports his shoulder is significantly better. He no longer has any pain at night. He denies neck pain or numbness and tingling into his fingers. \par \par Radiology Results 5/18/2022:\par o Left Shoulder : AP IR/ER and lateral views were obtained and reveal sclerosis of the greater tuberosity with degenerative arthritis of the AC joint, no lytic lesions.

## 2022-07-07 NOTE — PHYSICAL EXAM
[de-identified] : Constitutional\par o Appearance : well-nourished, well developed, alert, in no acute distress \par Head and Face\par o Head :\par ¦ Inspection : atraumatic, normocephalic\par Neurologic\par o Mental Status Examination :\par ¦ Orientation : alert and oriented X 3\par Psychiatric\par o Mood and Affect: mood normal, affect appropriate \par Cardiovascular\par o Observation/Palpation : - no swelling,normal pulses, normal temperature \par Lymphatic\par o Additional Nodes : No palpable lymph nodes present \par \par Cervical Spine\par o Inspection/Palpation :\par ¦ Inspection : alignment midline, normal degree of lordosis present\par ¦ Skin : normal appearance, no masses or tenderness, trachea midline\par ¦ Palpation : musculature is nontender to palpation\par o Range of Motion : arc of motion full in all planes, no crepitance or pain with ROM\par o Tests: Negative Spurling’s test \par \par Right Upper Extremity\par o Shoulder :\par ¦ Inspection/Palpation : no tenderness, swelling or deformities\par ¦ Range of Motion : full and painless in all planes, no crepitance\par ¦ Strength : forward elevation, internal rotation, external rotation, adduction and abduction 5/5\par ¦ Stability : no joint instability on provocative testing \par ¦ Tests: Lopez negative, Neer negative, Geronimo negative, negative drop arm test \par \par Left Upper Extremity\par o Shoulder :\par ¦ Inspection/Palpation : no anterior capsular or AC joint tenderness, no swelling or deformities\par ¦ Range of Motion : full forward flexion, symmetrical internal rotation with mild discomfort, full external rotation, no crepitance\par ¦ Strength : forward elevation 5/5, internal rotation 5/5, external rotation 5/5, external rotation at 90° of abduction 5/5, supraspinatus 5/5, adduction and abduction 5/5, biceps/triceps 5/5,  strength 5/5\par ¦ Stability : no joint instability on provocative testing\par ¦ Tests: Lopez negative, Neer negative, Geronimo negative, negative drop arm test, [place]'s test negative \par \par Gait: normal gait, no significant extremity swelling or lymphedema

## 2022-07-07 NOTE — DISCUSSION/SUMMARY
[de-identified] : I went over the pathophysiology of the patient's symptoms in great detail with the patient. I would recommend he avoids lifting anything straight overhead or past his shoulder height. He should avoid loading his shoulder with his body weight, such as with pushups. He should focus on light weight and high repetition exercises. I would like to see the patient back in the office on a PRN basis to reassess his condition. All of his questions were answered. He understands and consents to the plan.\par \par FU PRN.

## 2022-07-07 NOTE — ADDENDUM
[FreeTextEntry1] : I, Beto Medina, acted solely as a scribe for Dr. Damian Kohli on this date 07/07/2022.\par All medical record entries made by the Scribe were at my, Dr. Damian Kohli, direction and personally dictated by me on 07/07/2022. I have reviewed the chart and agree that the record accurately reflects my personal performance of the history, physical exam, assessment and plan. I have also personally directed, reviewed, and agreed with the chart.

## 2022-07-13 NOTE — PATIENT PROFILE ADULT - NSPROPASSIVESMOKEEXPOSURE_GEN_A_NUR
----- Message from Irish Dow sent at 7/13/2022  2:40 PM CDT -----  Name Of Caller:Najma            Provider Name:Tracy Aceves            Does patient feel the need to be seen today?No            Relationship to the Pt?:Patient            Contact Preference?:[phone]             What is the nature of the call?: Patient saw video with you and Dr Mike and has f/u questions. Patient is an ovarian cancer survivor.      No

## 2022-08-11 ENCOUNTER — APPOINTMENT (OUTPATIENT)
Dept: RADIATION ONCOLOGY | Facility: CLINIC | Age: 72
End: 2022-08-11

## 2022-08-11 VITALS
BODY MASS INDEX: 26.68 KG/M2 | OXYGEN SATURATION: 99 % | SYSTOLIC BLOOD PRESSURE: 119 MMHG | HEART RATE: 75 BPM | RESPIRATION RATE: 18 BRPM | HEIGHT: 73 IN | TEMPERATURE: 95.9 F | DIASTOLIC BLOOD PRESSURE: 78 MMHG | WEIGHT: 201.28 LBS

## 2022-08-11 PROCEDURE — 99213 OFFICE O/P EST LOW 20 MIN: CPT

## 2022-08-11 NOTE — PHYSICAL EXAM
[General Appearance - Well Developed] : well developed [General Appearance - Well Nourished] : well nourished [Sclera] : the sclera and conjunctiva were normal [Extraocular Movements] : extraocular movements were intact [Outer Ear] : the ears and nose were normal in appearance [Hearing Threshold Finger Rub Not [place]] : hearing was normal [Exaggerated Use Of Accessory Muscles For Inspiration] : no accessory muscle use [Edema] : no peripheral edema present [Nondistended] : nondistended [Cervical Lymph Nodes Enlarged Posterior Bilaterally] : posterior cervical [Cervical Lymph Nodes Enlarged Anterior Bilaterally] : anterior cervical [Range of Motion to Joints] : range of motion to joints [Skin Color & Pigmentation] : normal skin color and pigmentation [] : no rash [No Focal Deficits] : no focal deficits [Normal] : oriented to person, place and time, the affect was normal, the mood was normal and not anxious

## 2022-08-15 NOTE — HISTORY OF PRESENT ILLNESS
[FreeTextEntry1] : Mr. 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  \par \par 2/18/2022 CT Neck Soft Tissue: IMPRESSION: No evidence of recurrent or metastatic disease.\par \par 2/18/2022 CT Chest: IMPRESSION: Right upper lobe 0.3 cm nodule unchanged. No new nodule.\par \par 3/17/2022 Presents today for follow up. Doing well. C/o moderate mucous production. No other concerns. Tolerating all foods. Denies new LAD, changes in voice, hoarseness, unintentional wt loss, night sweats, bone pain, etc. \par \par 5/27/2022 Follow up with Dr Denson; Laryngoscopy done showed no abnormal findings.\par No new scans\par 8/9/2022 Today, he presents for follow up. Reports dry mouth. Tolerating all diets, denies dysphagia, voice alterations, weight loss.\par Follows with Ortho for left shoulder pain, radiology studies done showed arthritis.\par Interval history: s/p Coronary angiography with 4 cardiac stent placement in two arteries done at [place] 2 Months ago

## 2022-08-15 NOTE — DISEASE MANAGEMENT
[Clinical] : TNM Stage: c [I] : I [TTNM] : 1 [NTNM] : 0 [MTNM] : 0 [de-identified] : 28 tx/6300cGy [[place] [address]] : [id number] [de-identified] : Larynx

## 2022-09-19 ENCOUNTER — APPOINTMENT (OUTPATIENT)
Dept: ORTHOPEDIC SURGERY | Facility: CLINIC | Age: 72
End: 2022-09-19

## 2022-09-19 PROCEDURE — 99213 OFFICE O/P EST LOW 20 MIN: CPT | Mod: 25

## 2022-09-19 PROCEDURE — 73030 X-RAY EXAM OF SHOULDER: CPT | Mod: RT

## 2022-09-19 PROCEDURE — 20611 DRAIN/INJ JOINT/BURSA W/US: CPT | Mod: RT

## 2022-09-19 NOTE — ADDENDUM
[FreeTextEntry1] : I, Beto Medina, acted solely as a scribe for Dr. Damian Kohli on this date 09/19/2022.\par All medical record entries made by the Scribe were at my, Dr. Damian Kohli, direction and personally dictated by me on 09/19/2022. I have reviewed the chart and agree that the record accurately reflects my personal performance of the history, physical exam, assessment and plan. I have also personally directed, reviewed, and agreed with the chart.

## 2022-09-19 NOTE — DISCUSSION/SUMMARY
[de-identified] : I discussed the underlying pathophysiology of the patient's condition in great detail with them. I went over the patient's x-rays with them in great detail. The extent of the patient’s arthritis was discussed in great detail with them. The underlying pathophysiology was reviewed with the patient. Treatment options were discussed including; observation, cortisone injection(s). Activity modifications were discussed. He should avoid overhead lifting or loading the shoulder like with pushups. The patient elected to receive a cortisone injection into his right shoulder today and tolerated it well. I instructed the patient on ROM exercises and told them to take it easy. The use of ice and rest was reviewed with the patient. The patient may resume activities tomorrow. I would like to see the patient back in the office in 1 month to reassess his condition. If there is no improvement an MRI could be considered. All of their questions were answered. They understand and consent to the plan.\par \par FU in 1 month.\par after an intraarticular right shoulder cortisone injection today (09/19/2022).

## 2022-09-19 NOTE — PHYSICAL EXAM
[de-identified] : Constitutional\par o Appearance : well-nourished, well developed, alert, in no acute distress \par Head and Face\par o Head :\par ¦ Inspection : atraumatic, normocephalic\par Neurologic\par o Mental Status Examination :\par ¦ Orientation : alert and oriented X 3\par Psychiatric\par o Mood and Affect: mood normal, affect appropriate \par Cardiovascular\par o Observation/Palpation : - no swelling,normal pulses, normal temperature \par Lymphatic\par o Additional Nodes : No palpable lymph nodes present \par \par Cervical Spine\par o Inspection/Palpation :\par ¦ Inspection : alignment midline, normal degree of lordosis present\par ¦ Skin : normal appearance, no masses or tenderness, trachea midline\par ¦ Palpation : musculature is nontender to palpation\par o Range of Motion : arc of motion full in all planes, no crepitance or pain with ROM\par o Tests: Negative Spurling’s test \par \par Right Upper Extremity\par o Shoulder :\par ¦ Inspection/Palpation : no anterior capsular tenderness, no AC joint tenderness, posterior capsular tenderness, no swelling or deformities\par ¦ Range of Motion : full forward flexion, limited internal rotation with mild discomfort, full external rotation \par ¦ Strength : forward elevation 5/5, internal rotation 5/5, external rotation 5/5, external rotation at 90° of abduction 5/5, supraspinatus 5/5, adduction and abduction 5/5, biceps/triceps 5/5 \par ¦ Stability : no joint instability on provocative testing \par ¦ Tests: Lopez negative, Neer negative, Geronimo negative, negative drop arm test, [place]'s test negative \par \par Left Upper Extremity\par o Shoulder :\par ¦ Inspection/Palpation : no anterior capsular or AC joint tenderness, no swelling or deformities\par ¦ Range of Motion : full forward flexion, slightly limited internal rotation, full external rotation, no crepitance, no pain with cross chest maneuvers \par ¦ Strength : forward elevation 5/5, internal rotation 5/5, external rotation 5/5, external rotation at 90° of abduction 5/5, supraspinatus 5/5, adduction and abduction 5/5, biceps/triceps 5/5,  strength 5/5\par ¦ Stability : no joint instability on provocative testing\par ¦ Tests: Lopez negative, Neer negative, Geronimo negative, negative drop arm test, [place]'s test negative \par \par Gait: normal gait, no significant extremity swelling or lymphedema \par \par Radiology Results \par o Right Shoulder : AP internal/external rotation and outlet views were obtained and revealed degenerative arthritis of the glenohumeral joint and AC joints. A type 3 acromion. No lytic lesions of the visualized proximal humerus or visualized chest wall.\par \par o Right Shoulder: Indication- shoulder arthritis, Anatomic location- right intraarticular glenohumeral joint, Spray-area was sterilized with Betadine and alcohol and anesthetized with Ethyl Chloride , Needle used - 20G, Medications given- 5cc's lidocaine, 0.5cc's kenalog, 0.5cc's dexamethasone under Ultrasound guidance, and patient tolerated it well. He demonstrated significant improvement immediately after the injection.

## 2022-09-19 NOTE — HISTORY OF PRESENT ILLNESS
[de-identified] : 72 year old RHD male presents complaining of right shoulder pain. He denies injury. He does a lot of heavy lifting in the gym. 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 Pain is localized posteriorly and worse when lying on his back and reaching across his body to grab a seatbelt. He can do pretty much everything he needs to do despite the pain. He denies neck pain or numbness and tingling into his fingers. \par Of Note: He was seen for similar symptoms in the contralateral left shoulder in May 2022. He received a LEFT subacromial cortisone injection on 5/18/22 and reported dramatic relief. \par \par Radiology Results 5/18/2022:\par o Left Shoulder : AP IR/ER and lateral views were obtained and reveal sclerosis of the greater tuberosity with degenerative arthritis of the AC joint, no lytic lesions.

## 2022-09-27 ENCOUNTER — APPOINTMENT (OUTPATIENT)
Dept: OTOLARYNGOLOGY | Facility: CLINIC | Age: 72
End: 2022-09-27

## 2022-09-27 VITALS
OXYGEN SATURATION: 98 % | HEIGHT: 73 IN | DIASTOLIC BLOOD PRESSURE: 69 MMHG | HEART RATE: 62 BPM | WEIGHT: 199 LBS | BODY MASS INDEX: 26.37 KG/M2 | SYSTOLIC BLOOD PRESSURE: 106 MMHG

## 2022-09-27 PROCEDURE — 31575 DIAGNOSTIC LARYNGOSCOPY: CPT

## 2022-09-27 PROCEDURE — 99213 OFFICE O/P EST LOW 20 MIN: CPT | Mod: 25

## 2022-09-27 NOTE — CONSULT LETTER
[Dear  ___] : Dear  [unfilled], [Courtesy Letter:] : I had the pleasure of seeing your patient, [unfilled], in my office today. [Please see my note below.] : Please see my note below. [Sincerely,] : Sincerely, [FreeTextEntry2] : David Carrera MD  [FreeTextEntry3] : Ant Denson MD, FACS\par Chief of Otolaryngology and Head & Neck Surgery\par [facility]\par  - Dept. of Otolaryngology\par [facility] [employer]\par  [DrKike  ___] : Dr. FERNANDO

## 2022-09-27 NOTE — PHYSICAL EXAM
[Midline] : trachea located in midline position [Laryngoscopy Performed] : laryngoscopy was performed, see procedure section for findings [Normal] : no rashes [FreeTextEntry1] : Normal voice

## 2022-09-27 NOTE — HISTORY OF PRESENT ILLNESS
[de-identified] : 72 year old male follow up for larynx cancer T1N0M0 SCCa  s/p RT with Dr. Patterson on August 4, 2021.  States he is still having phlegm production of which he has to frequently clear his throat, mostly in the morning.   \par

## 2022-09-27 NOTE — ASSESSMENT
[FreeTextEntry1] : Pt remains SUZI.  Pt is now more than a year out from RT.  Will check TSH level.

## 2022-10-07 ENCOUNTER — NON-APPOINTMENT (OUTPATIENT)
Age: 72
End: 2022-10-07

## 2022-10-07 LAB — TSH SERPL-ACNC: 1.6 UIU/ML

## 2022-11-01 ENCOUNTER — OFFICE (OUTPATIENT)
Dept: URBAN - METROPOLITAN AREA CLINIC 70 | Facility: CLINIC | Age: 72
Setting detail: OPHTHALMOLOGY
End: 2022-11-01
Payer: MEDICARE

## 2022-11-01 DIAGNOSIS — H40.013: ICD-10-CM

## 2022-11-01 DIAGNOSIS — H11.042: ICD-10-CM

## 2022-11-01 DIAGNOSIS — H26.493: ICD-10-CM

## 2022-11-01 DIAGNOSIS — H35.54: ICD-10-CM

## 2022-11-01 DIAGNOSIS — Z96.1: ICD-10-CM

## 2022-11-01 DIAGNOSIS — H16.223: ICD-10-CM

## 2022-11-01 PROCEDURE — 92012 INTRM OPH EXAM EST PATIENT: CPT | Performed by: OPHTHALMOLOGY

## 2022-11-01 PROCEDURE — 92134 CPTRZ OPH DX IMG PST SGM RTA: CPT | Performed by: OPHTHALMOLOGY

## 2022-11-01 ASSESSMENT — KERATOMETRY
OS_K1POWER_DIOPTERS: 41.75
OD_K1POWER_DIOPTERS: 41.25
OD_K2POWER_DIOPTERS: 41.75
OD_AXISANGLE_DEGREES: 002
OS_AXISANGLE_DEGREES: 165
OS_K2POWER_DIOPTERS: 42.25

## 2022-11-01 ASSESSMENT — VISUAL ACUITY
OS_BCVA: 20/30+2
OD_BCVA: 20/50

## 2022-11-01 ASSESSMENT — REFRACTION_AUTOREFRACTION
OS_AXIS: 075
OD_SPHERE: +0.75
OD_CYLINDER: -0.75
OS_CYLINDER: -1.25
OD_AXIS: 080
OS_SPHERE: +0.75

## 2022-11-01 ASSESSMENT — SPHEQUIV_DERIVED
OS_SPHEQUIV: 0.125
OD_SPHEQUIV: 0.375

## 2022-11-01 ASSESSMENT — REFRACTION_CURRENTRX
OS_SPHERE: +1.25
OS_CYLINDER: SPH
OD_OVR_VA: 20/
OS_OVR_VA: 20/
OD_SPHERE: +1.25
OD_CYLINDER: SPH

## 2022-11-01 ASSESSMENT — SUPERFICIAL PUNCTATE KERATITIS (SPK)
OD_SPK: 1+
OS_SPK: 1+

## 2022-11-01 ASSESSMENT — CONFRONTATIONAL VISUAL FIELD TEST (CVF)
OS_FINDINGS: FULL
OD_FINDINGS: FULL

## 2022-11-01 ASSESSMENT — CORNEAL PTERYGIUM: OS_PTERYGIUM: 1MM

## 2022-11-01 ASSESSMENT — AXIALLENGTH_DERIVED
OS_AL: 24.1051
OD_AL: 24.1955

## 2022-11-07 ENCOUNTER — APPOINTMENT (OUTPATIENT)
Dept: ORTHOPEDIC SURGERY | Facility: CLINIC | Age: 72
End: 2022-11-07

## 2022-11-07 PROCEDURE — 99214 OFFICE O/P EST MOD 30 MIN: CPT | Mod: 25

## 2022-11-07 PROCEDURE — 20610 DRAIN/INJ JOINT/BURSA W/O US: CPT | Mod: RT

## 2022-11-23 ENCOUNTER — RX ONLY (RX ONLY)
Age: 72
End: 2022-11-23

## 2022-11-23 ENCOUNTER — OFFICE (OUTPATIENT)
Dept: URBAN - METROPOLITAN AREA CLINIC 70 | Facility: CLINIC | Age: 72
Setting detail: OPHTHALMOLOGY
End: 2022-11-23
Payer: MEDICARE

## 2022-11-23 DIAGNOSIS — H26.491: ICD-10-CM

## 2022-11-23 PROCEDURE — 66821 AFTER CATARACT LASER SURGERY: CPT | Performed by: OPHTHALMOLOGY

## 2022-11-23 ASSESSMENT — SPHEQUIV_DERIVED
OD_SPHEQUIV: 0.375
OS_SPHEQUIV: 0.125

## 2022-11-23 ASSESSMENT — KERATOMETRY
OS_K2POWER_DIOPTERS: 42.25
OS_K1POWER_DIOPTERS: 41.75
OD_K1POWER_DIOPTERS: 41.25
OS_AXISANGLE_DEGREES: 165
OD_AXISANGLE_DEGREES: 002
OD_K2POWER_DIOPTERS: 41.75

## 2022-11-23 ASSESSMENT — REFRACTION_CURRENTRX
OD_CYLINDER: SPH
OS_OVR_VA: 20/
OS_SPHERE: +1.25
OD_SPHERE: +1.25
OD_OVR_VA: 20/
OS_CYLINDER: SPH

## 2022-11-23 ASSESSMENT — AXIALLENGTH_DERIVED
OD_AL: 24.1955
OS_AL: 24.1051

## 2022-11-23 ASSESSMENT — CONFRONTATIONAL VISUAL FIELD TEST (CVF)
OD_FINDINGS: FULL
OS_FINDINGS: FULL

## 2022-11-23 ASSESSMENT — REFRACTION_AUTOREFRACTION
OS_AXIS: 075
OD_AXIS: 080
OD_SPHERE: +0.75
OS_CYLINDER: -1.25
OD_CYLINDER: -0.75
OS_SPHERE: +0.75

## 2022-11-23 ASSESSMENT — VISUAL ACUITY
OD_BCVA: 20/20+1
OS_BCVA: 20/25+2

## 2022-12-07 ENCOUNTER — OFFICE (OUTPATIENT)
Dept: URBAN - METROPOLITAN AREA CLINIC 70 | Facility: CLINIC | Age: 72
Setting detail: OPHTHALMOLOGY
End: 2022-12-07
Payer: MEDICARE

## 2022-12-07 DIAGNOSIS — H26.493: ICD-10-CM

## 2022-12-07 PROCEDURE — 99024 POSTOP FOLLOW-UP VISIT: CPT | Performed by: OPHTHALMOLOGY

## 2022-12-07 ASSESSMENT — SPHEQUIV_DERIVED
OD_SPHEQUIV: 0.5
OS_SPHEQUIV: 0.375

## 2022-12-07 ASSESSMENT — KERATOMETRY
OS_AXISANGLE_DEGREES: 174
OS_K2POWER_DIOPTERS: 42.00
OD_K2POWER_DIOPTERS: 41.75
OS_K1POWER_DIOPTERS: 41.75
OD_AXISANGLE_DEGREES: 003
OD_K1POWER_DIOPTERS: 41.25

## 2022-12-07 ASSESSMENT — REFRACTION_CURRENTRX
OD_CYLINDER: SPH
OD_SPHERE: +1.25
OS_SPHERE: +1.25
OS_CYLINDER: SPH
OS_OVR_VA: 20/
OD_OVR_VA: 20/

## 2022-12-07 ASSESSMENT — REFRACTION_AUTOREFRACTION
OS_SPHERE: +1.00
OS_AXIS: 080
OD_SPHERE: +1.00
OD_AXIS: 086
OS_CYLINDER: -1.25
OD_CYLINDER: -1.00

## 2022-12-07 ASSESSMENT — AXIALLENGTH_DERIVED
OD_AL: 24.1444
OS_AL: 24.0515

## 2022-12-07 ASSESSMENT — SUPERFICIAL PUNCTATE KERATITIS (SPK)
OD_SPK: 1+
OS_SPK: 1+

## 2022-12-07 ASSESSMENT — CONFRONTATIONAL VISUAL FIELD TEST (CVF)
OS_FINDINGS: FULL
OD_FINDINGS: FULL

## 2022-12-07 ASSESSMENT — CORNEAL PTERYGIUM: OS_PTERYGIUM: 1MM

## 2022-12-07 ASSESSMENT — VISUAL ACUITY
OS_BCVA: 20/25+2
OD_BCVA: 20/20+1

## 2023-01-09 ENCOUNTER — APPOINTMENT (OUTPATIENT)
Dept: ORTHOPEDIC SURGERY | Facility: CLINIC | Age: 73
End: 2023-01-09
Payer: MEDICARE

## 2023-01-09 ENCOUNTER — RESULT REVIEW (OUTPATIENT)
Age: 73
End: 2023-01-09

## 2023-01-09 DIAGNOSIS — M75.42 IMPINGEMENT SYNDROME OF LEFT SHOULDER: ICD-10-CM

## 2023-01-09 PROCEDURE — 99214 OFFICE O/P EST MOD 30 MIN: CPT

## 2023-01-12 ENCOUNTER — APPOINTMENT (OUTPATIENT)
Dept: MRI IMAGING | Facility: CLINIC | Age: 73
End: 2023-01-12
Payer: MEDICARE

## 2023-01-12 ENCOUNTER — RESULT REVIEW (OUTPATIENT)
Age: 73
End: 2023-01-12

## 2023-01-12 ENCOUNTER — OUTPATIENT (OUTPATIENT)
Dept: OUTPATIENT SERVICES | Facility: HOSPITAL | Age: 73
LOS: 1 days | End: 2023-01-12
Payer: MEDICARE

## 2023-01-12 DIAGNOSIS — Z98.890 OTHER SPECIFIED POSTPROCEDURAL STATES: Chronic | ICD-10-CM

## 2023-01-12 DIAGNOSIS — M19.019 PRIMARY OSTEOARTHRITIS, UNSPECIFIED SHOULDER: ICD-10-CM

## 2023-01-12 DIAGNOSIS — Z98.49 CATARACT EXTRACTION STATUS, UNSPECIFIED EYE: Chronic | ICD-10-CM

## 2023-01-12 DIAGNOSIS — Z95.5 PRESENCE OF CORONARY ANGIOPLASTY IMPLANT AND GRAFT: Chronic | ICD-10-CM

## 2023-01-12 DIAGNOSIS — Z98.42 CATARACT EXTRACTION STATUS, LEFT EYE: Chronic | ICD-10-CM

## 2023-01-12 PROCEDURE — 73221 MRI JOINT UPR EXTREM W/O DYE: CPT | Mod: 26,RT,MH

## 2023-01-12 PROCEDURE — 73221 MRI JOINT UPR EXTREM W/O DYE: CPT | Mod: 26,LT,MH,76

## 2023-01-12 PROCEDURE — 73221 MRI JOINT UPR EXTREM W/O DYE: CPT

## 2023-01-17 ENCOUNTER — APPOINTMENT (OUTPATIENT)
Dept: OTOLARYNGOLOGY | Facility: CLINIC | Age: 73
End: 2023-01-17
Payer: MEDICARE

## 2023-01-17 VITALS
HEART RATE: 71 BPM | SYSTOLIC BLOOD PRESSURE: 149 MMHG | HEIGHT: 73 IN | BODY MASS INDEX: 26.51 KG/M2 | DIASTOLIC BLOOD PRESSURE: 78 MMHG | WEIGHT: 200 LBS

## 2023-01-17 DIAGNOSIS — R91.1 SOLITARY PULMONARY NODULE: ICD-10-CM

## 2023-01-17 PROCEDURE — 99213 OFFICE O/P EST LOW 20 MIN: CPT

## 2023-01-17 RX ORDER — ASPIRIN 81 MG
81 TABLET, DELAYED RELEASE (ENTERIC COATED) ORAL
Refills: 0 | Status: COMPLETED | COMMUNITY
End: 2023-01-17

## 2023-01-17 RX ORDER — CLOPIDOGREL BISULFATE 75 MG/1
75 TABLET, FILM COATED ORAL
Qty: 30 | Refills: 0 | Status: COMPLETED | COMMUNITY
Start: 2022-06-22 | End: 2023-01-17

## 2023-01-17 NOTE — PHYSICAL EXAM
[Midline] : trachea located in midline position [Normal] : no rashes [FreeTextEntry1] : Normal voice [de-identified] : IDL: OP, HP, and larynx WNL with normal VC mobility.  No masses.

## 2023-01-17 NOTE — HISTORY OF PRESENT ILLNESS
[de-identified] : 72 year old male with history of T1N0M0 SCCa of the larynx s/p RT with Dr. Patterson on August 4, 2021.follow up for larynx cancer evaluation.\par Last clinic visit was 9/27/22 at which time patient remained SUZI & was told to get a TSH level check. 9/28/22 TSH performed - 1.60 uIU/mL \par Reports intermittent dysphagia with solid food, dysphonia - feels voice gets raspy especially in the morning, constantly has to clear throat - worst in the morning, progressively improves as the day goes on. \par Denies odynophagia, aspirations, dyspnea, otalgia.

## 2023-01-17 NOTE — ASSESSMENT
[FreeTextEntry1] : Pt remains SUZI.  He will f/u in August for a recheck.  \par \par Pt getting f/u CT chest to recheck lung nodule

## 2023-01-17 NOTE — REASON FOR VISIT
[Subsequent Evaluation] : a subsequent evaluation for [FreeTextEntry2] : follow up for larynx cancer

## 2023-01-17 NOTE — CONSULT LETTER
[Dear  ___] : Dear  [unfilled], [Courtesy Letter:] : I had the pleasure of seeing your patient, [unfilled], in my office today. [Please see my note below.] : Please see my note below. [Sincerely,] : Sincerely, [FreeTextEntry2] : David Carrera MD  [FreeTextEntry3] : Ant Denson MD, FACS\par Chief of Otolaryngology [facility]\par  - Dept. of Otolaryngology\par [facility]  [DrKike  ___] : Dr. FERNANDO

## 2023-01-20 ENCOUNTER — APPOINTMENT (OUTPATIENT)
Dept: CT IMAGING | Facility: CLINIC | Age: 73
End: 2023-01-20
Payer: MEDICARE

## 2023-01-20 ENCOUNTER — OUTPATIENT (OUTPATIENT)
Dept: OUTPATIENT SERVICES | Facility: HOSPITAL | Age: 73
LOS: 1 days | End: 2023-01-20
Payer: MEDICARE

## 2023-01-20 DIAGNOSIS — C32.0 MALIGNANT NEOPLASM OF GLOTTIS: ICD-10-CM

## 2023-01-20 DIAGNOSIS — Z98.49 CATARACT EXTRACTION STATUS, UNSPECIFIED EYE: Chronic | ICD-10-CM

## 2023-01-20 DIAGNOSIS — Z95.5 PRESENCE OF CORONARY ANGIOPLASTY IMPLANT AND GRAFT: Chronic | ICD-10-CM

## 2023-01-20 DIAGNOSIS — Z98.42 CATARACT EXTRACTION STATUS, LEFT EYE: Chronic | ICD-10-CM

## 2023-01-20 DIAGNOSIS — Z98.890 OTHER SPECIFIED POSTPROCEDURAL STATES: Chronic | ICD-10-CM

## 2023-01-20 PROCEDURE — 71250 CT THORAX DX C-: CPT

## 2023-01-20 PROCEDURE — 71250 CT THORAX DX C-: CPT | Mod: 26,MH

## 2023-02-06 ENCOUNTER — APPOINTMENT (OUTPATIENT)
Dept: ORTHOPEDIC SURGERY | Facility: CLINIC | Age: 73
End: 2023-02-06
Payer: MEDICARE

## 2023-02-06 PROCEDURE — 99214 OFFICE O/P EST MOD 30 MIN: CPT

## 2023-02-17 ENCOUNTER — OUTPATIENT (OUTPATIENT)
Dept: OUTPATIENT SERVICES | Facility: HOSPITAL | Age: 73
LOS: 1 days | End: 2023-02-17
Payer: MEDICARE

## 2023-02-17 VITALS
RESPIRATION RATE: 16 BRPM | SYSTOLIC BLOOD PRESSURE: 110 MMHG | DIASTOLIC BLOOD PRESSURE: 68 MMHG | HEART RATE: 66 BPM | WEIGHT: 209 LBS | TEMPERATURE: 97 F | OXYGEN SATURATION: 97 % | HEIGHT: 73 IN

## 2023-02-17 DIAGNOSIS — Z98.890 OTHER SPECIFIED POSTPROCEDURAL STATES: Chronic | ICD-10-CM

## 2023-02-17 DIAGNOSIS — Z95.5 PRESENCE OF CORONARY ANGIOPLASTY IMPLANT AND GRAFT: Chronic | ICD-10-CM

## 2023-02-17 DIAGNOSIS — Z01.818 ENCOUNTER FOR OTHER PREPROCEDURAL EXAMINATION: ICD-10-CM

## 2023-02-17 DIAGNOSIS — M75.101 UNSPECIFIED ROTATOR CUFF TEAR OR RUPTURE OF RIGHT SHOULDER, NOT SPECIFIED AS TRAUMATIC: ICD-10-CM

## 2023-02-17 DIAGNOSIS — Z98.49 CATARACT EXTRACTION STATUS, UNSPECIFIED EYE: Chronic | ICD-10-CM

## 2023-02-17 DIAGNOSIS — Z98.42 CATARACT EXTRACTION STATUS, LEFT EYE: Chronic | ICD-10-CM

## 2023-02-17 DIAGNOSIS — Z92.3 PERSONAL HISTORY OF IRRADIATION: Chronic | ICD-10-CM

## 2023-02-17 LAB
ALBUMIN SERPL ELPH-MCNC: 3.4 G/DL — SIGNIFICANT CHANGE UP (ref 3.3–5)
ALP SERPL-CCNC: 91 U/L — SIGNIFICANT CHANGE UP (ref 30–120)
ALT FLD-CCNC: 34 U/L DA — SIGNIFICANT CHANGE UP (ref 10–60)
ANION GAP SERPL CALC-SCNC: 9 MMOL/L — SIGNIFICANT CHANGE UP (ref 5–17)
APTT BLD: 38.7 SEC — HIGH (ref 27.5–35.5)
AST SERPL-CCNC: 25 U/L — SIGNIFICANT CHANGE UP (ref 10–40)
BILIRUB SERPL-MCNC: 0.8 MG/DL — SIGNIFICANT CHANGE UP (ref 0.2–1.2)
BUN SERPL-MCNC: 26 MG/DL — HIGH (ref 7–23)
CALCIUM SERPL-MCNC: 8.5 MG/DL — SIGNIFICANT CHANGE UP (ref 8.4–10.5)
CHLORIDE SERPL-SCNC: 106 MMOL/L — SIGNIFICANT CHANGE UP (ref 96–108)
CO2 SERPL-SCNC: 26 MMOL/L — SIGNIFICANT CHANGE UP (ref 22–31)
CREAT SERPL-MCNC: 0.94 MG/DL — SIGNIFICANT CHANGE UP (ref 0.5–1.3)
EGFR: 86 ML/MIN/1.73M2 — SIGNIFICANT CHANGE UP
GLUCOSE SERPL-MCNC: 93 MG/DL — SIGNIFICANT CHANGE UP (ref 70–99)
HCT VFR BLD CALC: 43.7 % — SIGNIFICANT CHANGE UP (ref 39–50)
HGB BLD-MCNC: 14.7 G/DL — SIGNIFICANT CHANGE UP (ref 13–17)
INR BLD: 1.56 RATIO — HIGH (ref 0.88–1.16)
MCHC RBC-ENTMCNC: 29.1 PG — SIGNIFICANT CHANGE UP (ref 27–34)
MCHC RBC-ENTMCNC: 33.6 GM/DL — SIGNIFICANT CHANGE UP (ref 32–36)
MCV RBC AUTO: 86.4 FL — SIGNIFICANT CHANGE UP (ref 80–100)
NRBC # BLD: 0 /100 WBCS — SIGNIFICANT CHANGE UP (ref 0–0)
PLATELET # BLD AUTO: 156 K/UL — SIGNIFICANT CHANGE UP (ref 150–400)
POTASSIUM SERPL-MCNC: 4.2 MMOL/L — SIGNIFICANT CHANGE UP (ref 3.5–5.3)
POTASSIUM SERPL-SCNC: 4.2 MMOL/L — SIGNIFICANT CHANGE UP (ref 3.5–5.3)
PROT SERPL-MCNC: 6.4 G/DL — SIGNIFICANT CHANGE UP (ref 6–8.3)
PROTHROM AB SERPL-ACNC: 18 SEC — HIGH (ref 10.5–13.4)
RBC # BLD: 5.06 M/UL — SIGNIFICANT CHANGE UP (ref 4.2–5.8)
RBC # FLD: 12.8 % — SIGNIFICANT CHANGE UP (ref 10.3–14.5)
SODIUM SERPL-SCNC: 141 MMOL/L — SIGNIFICANT CHANGE UP (ref 135–145)
WBC # BLD: 5.73 K/UL — SIGNIFICANT CHANGE UP (ref 3.8–10.5)
WBC # FLD AUTO: 5.73 K/UL — SIGNIFICANT CHANGE UP (ref 3.8–10.5)

## 2023-02-17 PROCEDURE — 80053 COMPREHEN METABOLIC PANEL: CPT

## 2023-02-17 PROCEDURE — 85610 PROTHROMBIN TIME: CPT

## 2023-02-17 PROCEDURE — 36415 COLL VENOUS BLD VENIPUNCTURE: CPT

## 2023-02-17 PROCEDURE — 93010 ELECTROCARDIOGRAM REPORT: CPT

## 2023-02-17 PROCEDURE — 85027 COMPLETE CBC AUTOMATED: CPT

## 2023-02-17 PROCEDURE — 93005 ELECTROCARDIOGRAM TRACING: CPT

## 2023-02-17 PROCEDURE — G0463: CPT

## 2023-02-17 PROCEDURE — 85730 THROMBOPLASTIN TIME PARTIAL: CPT

## 2023-02-17 RX ORDER — CHOLECALCIFEROL (VITAMIN D3) 125 MCG
1 CAPSULE ORAL
Qty: 0 | Refills: 0 | DISCHARGE

## 2023-02-17 NOTE — H&P PST ADULT - PROBLEM SELECTOR PLAN 1
Exam Under Anesthesia- Arthroscopic RIGHT Shoulder Possible Rotator Cuff Repair on 02/28/2023.   Medical and Cardiac Clearances  NPO as per Anesthesia- no meds on AM of surgery  Hold Xarelto as per Cardiologist  Instructions reviewed and questions addressed  Covid19 screening on 2/26/2023@ 9am Exam Under Anesthesia- Arthroscopic RIGHT Shoulder Possible Rotator Cuff Repair on 02/28/2023.   Medical and Cardiac Clearances  NPO as per Anesthesia- no meds on AM of surgery  Hold Xarelto as per Cardiologist (ask about taking Aspirin 81mg while off Xarelto)  Instructions reviewed and questions addressed  Covid19 screening on 2/26/2023@ 9am

## 2023-02-17 NOTE — H&P PST ADULT - NSICDXPROCEDURE_GEN_ALL_CORE_FT
CRNA at bedside, sitting up for epidural.
DC instructions reviewed with patient.
Dr Michael Cleaves updated on SVE
Dr. Aldair uReda returned call and orders received.
Education done with patient, also instructed on normal  reflexes and normal  female vaginal discharge
Full feeling not yet returned yet in legs. Unable to take steps, urinates on bed pan  And pads applied.
Pt. Arrived to unit ambulatory from office with ctxs, educated on POC and in restroom at this time. 39w3d.
Talked with Kris Braun CNM concerning patient stuffy and sneezing, patient stating allergies, orders received
Update given to Dr Cece Workman, may start pitocin.
PROCEDURES:  Arthroscopy of right shoulder 17-Feb-2023 13:18:33  Rosa Maria Bean

## 2023-02-17 NOTE — H&P PST ADULT - NSICDXFAMILYHX_GEN_ALL_CORE_FT
FAMILY HISTORY:  Father  Still living? Unknown  Family history of valvular heart disease, Age at diagnosis: Age Unknown    Mother  Still living? Unknown  FH: diabetes mellitus, Age at diagnosis: Age Unknown    Sibling  Still living? Unknown  Family history of AML (acute myeloid leukemia), Age at diagnosis: Age Unknown     FAMILY HISTORY:  Father  Still living? Unknown  Family history of valvular heart disease, Age at diagnosis: Age Unknown    Mother  Still living? Unknown  FH: diabetes mellitus, Age at diagnosis: Age Unknown    Sibling  Still living? Yes, Estimated age: 71-80  Family history of AML (acute myeloid leukemia), Age at diagnosis: Age Unknown  Family history of DVT, Age at diagnosis: Age Unknown

## 2023-02-17 NOTE — H&P PST ADULT - NSANTHOSAYNRD_GEN_A_CORE
No. ABHIJEET screening performed.  STOP BANG Legend: 0-2 = LOW Risk; 3-4 = INTERMEDIATE Risk; 5-8 = HIGH Risk

## 2023-02-17 NOTE — H&P PST ADULT - MUSCULOSKELETAL
details… right shoulder/no joint swelling/no joint erythema/no joint warmth/no calf tenderness/decreased ROM/decreased ROM due to pain/decreased strength

## 2023-02-17 NOTE — H&P PST ADULT - NSICDXPASTMEDICALHX_GEN_ALL_CORE_FT
Principal Discharge DX:	Eczema, unspecified type
PAST MEDICAL HISTORY:  Atrial fibrillation     CAD (coronary artery disease)     Colonic polyp     Factor II deficiency     Hemorrhoids     Hyperlipemia     Lesion of vocal cord     Localized cancer of throat

## 2023-02-17 NOTE — H&P PST ADULT - NSICDXPASTSURGICALHX_GEN_ALL_CORE_FT
PAST SURGICAL HISTORY:  H/O cataract extraction right eye    History of left cataract extraction     S/P coronary artery stent placement     S/P radiation therapy     S/P right knee arthroscopy

## 2023-02-17 NOTE — H&P PST ADULT - HISTORY OF PRESENT ILLNESS
73yo right hand dominant male patient with approximately 6 month history of progressively worsening right shoulder pain. He has had 2 injections with minimal temporary improvement. He rates the pain at 8/10 especially at night and first thing in the morning. He is taking Tylenol prn with some relief. Surgery has been recommended and he presents today for PSTs.

## 2023-02-26 ENCOUNTER — OUTPATIENT (OUTPATIENT)
Dept: OUTPATIENT SERVICES | Facility: HOSPITAL | Age: 73
LOS: 1 days | End: 2023-02-26
Payer: MEDICARE

## 2023-02-26 DIAGNOSIS — Z98.49 CATARACT EXTRACTION STATUS, UNSPECIFIED EYE: Chronic | ICD-10-CM

## 2023-02-26 DIAGNOSIS — Z20.828 CONTACT WITH AND (SUSPECTED) EXPOSURE TO OTHER VIRAL COMMUNICABLE DISEASES: ICD-10-CM

## 2023-02-26 DIAGNOSIS — Z98.890 OTHER SPECIFIED POSTPROCEDURAL STATES: Chronic | ICD-10-CM

## 2023-02-26 DIAGNOSIS — Z98.42 CATARACT EXTRACTION STATUS, LEFT EYE: Chronic | ICD-10-CM

## 2023-02-26 DIAGNOSIS — Z95.5 PRESENCE OF CORONARY ANGIOPLASTY IMPLANT AND GRAFT: Chronic | ICD-10-CM

## 2023-02-26 DIAGNOSIS — Z92.3 PERSONAL HISTORY OF IRRADIATION: Chronic | ICD-10-CM

## 2023-02-26 LAB — SARS-COV-2 RNA SPEC QL NAA+PROBE: SIGNIFICANT CHANGE UP

## 2023-02-26 PROCEDURE — U0003: CPT

## 2023-02-26 PROCEDURE — U0005: CPT

## 2023-02-27 ENCOUNTER — TRANSCRIPTION ENCOUNTER (OUTPATIENT)
Age: 73
End: 2023-02-27

## 2023-02-28 ENCOUNTER — APPOINTMENT (OUTPATIENT)
Dept: ORTHOPEDIC SURGERY | Facility: HOSPITAL | Age: 73
End: 2023-02-28

## 2023-02-28 ENCOUNTER — TRANSCRIPTION ENCOUNTER (OUTPATIENT)
Age: 73
End: 2023-02-28

## 2023-02-28 ENCOUNTER — OUTPATIENT (OUTPATIENT)
Dept: OUTPATIENT SERVICES | Facility: HOSPITAL | Age: 73
LOS: 1 days | End: 2023-02-28
Payer: MEDICARE

## 2023-02-28 VITALS
OXYGEN SATURATION: 100 % | WEIGHT: 201.28 LBS | SYSTOLIC BLOOD PRESSURE: 134 MMHG | HEART RATE: 60 BPM | DIASTOLIC BLOOD PRESSURE: 80 MMHG | TEMPERATURE: 97 F | RESPIRATION RATE: 17 BRPM | HEIGHT: 73 IN

## 2023-02-28 VITALS
OXYGEN SATURATION: 100 % | RESPIRATION RATE: 21 BRPM | HEART RATE: 54 BPM | DIASTOLIC BLOOD PRESSURE: 63 MMHG | SYSTOLIC BLOOD PRESSURE: 106 MMHG

## 2023-02-28 DIAGNOSIS — Z92.3 PERSONAL HISTORY OF IRRADIATION: Chronic | ICD-10-CM

## 2023-02-28 DIAGNOSIS — Z98.890 OTHER SPECIFIED POSTPROCEDURAL STATES: Chronic | ICD-10-CM

## 2023-02-28 DIAGNOSIS — Z95.5 PRESENCE OF CORONARY ANGIOPLASTY IMPLANT AND GRAFT: Chronic | ICD-10-CM

## 2023-02-28 DIAGNOSIS — M75.101 UNSPECIFIED ROTATOR CUFF TEAR OR RUPTURE OF RIGHT SHOULDER, NOT SPECIFIED AS TRAUMATIC: ICD-10-CM

## 2023-02-28 DIAGNOSIS — Z98.49 CATARACT EXTRACTION STATUS, UNSPECIFIED EYE: Chronic | ICD-10-CM

## 2023-02-28 DIAGNOSIS — Z98.42 CATARACT EXTRACTION STATUS, LEFT EYE: Chronic | ICD-10-CM

## 2023-02-28 LAB
APTT BLD: 31.3 SEC — SIGNIFICANT CHANGE UP (ref 27.5–35.5)
INR BLD: 1.09 RATIO — SIGNIFICANT CHANGE UP (ref 0.88–1.16)
PROTHROM AB SERPL-ACNC: 12.9 SEC — SIGNIFICANT CHANGE UP (ref 10.5–13.4)

## 2023-02-28 PROCEDURE — 36415 COLL VENOUS BLD VENIPUNCTURE: CPT

## 2023-02-28 PROCEDURE — 29828 SHO ARTHRS SRG BICP TENODSIS: CPT | Mod: RT

## 2023-02-28 PROCEDURE — 29823 SHO ARTHRS SRG XTNSV DBRDMT: CPT | Mod: RT

## 2023-02-28 PROCEDURE — 29826 SHO ARTHRS SRG DECOMPRESSION: CPT | Mod: RT

## 2023-02-28 PROCEDURE — 85730 THROMBOPLASTIN TIME PARTIAL: CPT

## 2023-02-28 PROCEDURE — 85610 PROTHROMBIN TIME: CPT

## 2023-02-28 RX ORDER — APREPITANT 80 MG/1
40 CAPSULE ORAL ONCE
Refills: 0 | Status: COMPLETED | OUTPATIENT
Start: 2023-02-28 | End: 2023-02-28

## 2023-02-28 RX ORDER — SODIUM CHLORIDE 9 MG/ML
1000 INJECTION, SOLUTION INTRAVENOUS
Refills: 0 | Status: DISCONTINUED | OUTPATIENT
Start: 2023-02-28 | End: 2023-02-28

## 2023-02-28 RX ORDER — CHLORHEXIDINE GLUCONATE 213 G/1000ML
1 SOLUTION TOPICAL ONCE
Refills: 0 | Status: COMPLETED | OUTPATIENT
Start: 2023-02-28 | End: 2023-02-28

## 2023-02-28 RX ORDER — OXYCODONE AND ACETAMINOPHEN 5; 325 MG/1; MG/1
1 TABLET ORAL ONCE
Refills: 0 | Status: DISCONTINUED | OUTPATIENT
Start: 2023-02-28 | End: 2023-02-28

## 2023-02-28 RX ORDER — RIVAROXABAN 15 MG-20MG
1 KIT ORAL
Qty: 0 | Refills: 0 | DISCHARGE

## 2023-02-28 RX ORDER — HYDROMORPHONE HYDROCHLORIDE 2 MG/ML
0.25 INJECTION INTRAMUSCULAR; INTRAVENOUS; SUBCUTANEOUS
Refills: 0 | Status: DISCONTINUED | OUTPATIENT
Start: 2023-02-28 | End: 2023-02-28

## 2023-02-28 RX ORDER — CHOLECALCIFEROL (VITAMIN D3) 125 MCG
1 CAPSULE ORAL
Qty: 0 | Refills: 0 | DISCHARGE

## 2023-02-28 RX ORDER — ROSUVASTATIN CALCIUM 5 MG/1
1 TABLET ORAL
Qty: 0 | Refills: 0 | DISCHARGE

## 2023-02-28 RX ORDER — OXYCODONE AND ACETAMINOPHEN 5; 325 MG/1; MG/1
1 TABLET ORAL
Qty: 15 | Refills: 0
Start: 2023-02-28

## 2023-02-28 RX ORDER — ASPIRIN/CALCIUM CARB/MAGNESIUM 324 MG
1 TABLET ORAL
Qty: 0 | Refills: 0 | DISCHARGE

## 2023-02-28 RX ORDER — HYDROMORPHONE HYDROCHLORIDE 2 MG/ML
0.5 INJECTION INTRAMUSCULAR; INTRAVENOUS; SUBCUTANEOUS
Refills: 0 | Status: DISCONTINUED | OUTPATIENT
Start: 2023-02-28 | End: 2023-02-28

## 2023-02-28 RX ORDER — CEFAZOLIN SODIUM 1 G
2000 VIAL (EA) INJECTION ONCE
Refills: 0 | Status: COMPLETED | OUTPATIENT
Start: 2023-02-28 | End: 2023-02-28

## 2023-02-28 RX ORDER — ONDANSETRON 8 MG/1
4 TABLET, FILM COATED ORAL ONCE
Refills: 0 | Status: DISCONTINUED | OUTPATIENT
Start: 2023-02-28 | End: 2023-02-28

## 2023-02-28 RX ADMIN — CHLORHEXIDINE GLUCONATE 1 APPLICATION(S): 213 SOLUTION TOPICAL at 11:11

## 2023-02-28 RX ADMIN — APREPITANT 40 MILLIGRAM(S): 80 CAPSULE ORAL at 11:10

## 2023-02-28 RX ADMIN — SODIUM CHLORIDE 75 MILLILITER(S): 9 INJECTION, SOLUTION INTRAVENOUS at 15:58

## 2023-02-28 NOTE — ASU DISCHARGE PLAN (ADULT/PEDIATRIC) - CARE PROVIDER_API CALL
Damian Kohli)  Orthopaedic Surgery  [address]  [place] [address]  Phone: [phone]  Fax: [phone]  Follow Up Time:

## 2023-02-28 NOTE — BRIEF OPERATIVE NOTE - NSICDXBRIEFPOSTOP_GEN_ALL_CORE_FT
POST-OP DIAGNOSIS:  Impingement syndrome of shoulder 28-Feb-2023 15:45:02  Rosalia Jernigan  Impingement syndrome of shoulder 28-Feb-2023 15:45:02  Rosalia Jernigan

## 2023-02-28 NOTE — ASU DISCHARGE PLAN (ADULT/PEDIATRIC) - NS MD DC FALL RISK RISK
For information on Fall & Injury Prevention, visit: [email or web address] OR  [email or web address] OR  https://www.cdc.gov/steadi/patient.html

## 2023-02-28 NOTE — BRIEF OPERATIVE NOTE - NSICDXBRIEFPREOP_GEN_ALL_CORE_FT
PRE-OP DIAGNOSIS:  Impingement syndrome of shoulder 28-Feb-2023 15:44:49  Rosalia Jernigan  Impingement syndrome of shoulder 28-Feb-2023 15:44:49  Rosalia Jernigan

## 2023-03-01 PROBLEM — I48.91 UNSPECIFIED ATRIAL FIBRILLATION: Chronic | Status: ACTIVE | Noted: 2023-02-17

## 2023-03-06 ENCOUNTER — APPOINTMENT (OUTPATIENT)
Dept: ORTHOPEDIC SURGERY | Facility: CLINIC | Age: 73
End: 2023-03-06
Payer: MEDICARE

## 2023-03-06 PROBLEM — I25.10 ATHEROSCLEROTIC HEART DISEASE OF NATIVE CORONARY ARTERY WITHOUT ANGINA PECTORIS: Chronic | Status: ACTIVE | Noted: 2023-02-17

## 2023-03-06 PROCEDURE — 99024 POSTOP FOLLOW-UP VISIT: CPT

## 2023-03-06 PROCEDURE — 73030 X-RAY EXAM OF SHOULDER: CPT | Mod: RT

## 2023-04-06 ENCOUNTER — APPOINTMENT (OUTPATIENT)
Dept: RADIATION ONCOLOGY | Facility: CLINIC | Age: 73
End: 2023-04-06
Payer: MEDICARE

## 2023-04-06 VITALS
BODY MASS INDEX: 27.49 KG/M2 | HEART RATE: 82 BPM | RESPIRATION RATE: 18 BRPM | WEIGHT: 208.31 LBS | TEMPERATURE: 96.8 F | DIASTOLIC BLOOD PRESSURE: 75 MMHG | SYSTOLIC BLOOD PRESSURE: 116 MMHG | OXYGEN SATURATION: 100 %

## 2023-04-06 PROCEDURE — 99213 OFFICE O/P EST LOW 20 MIN: CPT | Mod: GC

## 2023-04-06 NOTE — REVIEW OF SYSTEMS
[Dysphagia: Grade 0] : Dysphagia: Grade 0 [Fatigue: Grade 0] : Fatigue: Grade 0 [Mucositis Oral: Grade 0] : Mucositis Oral: Grade 0  [Xerostomia: Grade 0] : Xerostomia: Grade 0 [Oral Pain: Grade 0] : Oral Pain: Grade 0 [Dysgeusia: Grade 0] : Dysgeusia: Grade 0 [Hoarseness: Grade 0] : Hoarseness: Grade 0 [Skin Hyperpigmentation: Grade 0] : Skin Hyperpigmentation: Grade 0 [Dermatitis Radiation: Grade 0] : Dermatitis Radiation: Grade 0 [Negative] : Psychiatric

## 2023-04-06 NOTE — PHYSICAL EXAM
[No Focal Deficits] : no focal deficits [Normal] : oriented to person, place and time, the affect was normal, the mood was normal and not anxious

## 2023-04-11 NOTE — HISTORY OF PRESENT ILLNESS
[FreeTextEntry1] : Mr. 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  \par \par 1/20/2023 CT Chest: IMPRESSION: Stable very small nodule in the right upper lobe when compared to previous exam.\par \par Presents today for follow up.\par \par

## 2023-04-24 ENCOUNTER — APPOINTMENT (OUTPATIENT)
Dept: ORTHOPEDIC SURGERY | Facility: CLINIC | Age: 73
End: 2023-04-24
Payer: MEDICARE

## 2023-04-24 DIAGNOSIS — M75.101 UNSPECIFIED ROTATOR CUFF TEAR OR RUPTURE OF RIGHT SHOULDER, NOT SPECIFIED AS TRAUMATIC: ICD-10-CM

## 2023-04-24 DIAGNOSIS — M19.012 PRIMARY OSTEOARTHRITIS, LEFT SHOULDER: ICD-10-CM

## 2023-04-24 PROCEDURE — 99213 OFFICE O/P EST LOW 20 MIN: CPT | Mod: 24

## 2023-04-25 NOTE — BRIEF OPERATIVE NOTE - NSICDXBRIEFPROCEDURE_GEN_ALL_CORE_FT
Detail Level: Zone PROCEDURES:  Right shoulder arthroscopy 28-Feb-2023 15:44:08 subacromial decompression,lysis biceps tendon,debridement Rosalia Jernigan

## 2023-05-11 NOTE — ED ADULT NURSE REASSESSMENT NOTE - NEURO SENSATION
I have personally seen and examined the patient. I have collaborated with and supervised the
sensory intact

## 2023-07-06 ENCOUNTER — APPOINTMENT (OUTPATIENT)
Dept: ORTHOPEDIC SURGERY | Facility: CLINIC | Age: 73
End: 2023-07-06
Payer: MEDICARE

## 2023-07-06 VITALS — WEIGHT: 200 LBS | HEIGHT: 73 IN | BODY MASS INDEX: 26.51 KG/M2

## 2023-07-06 DIAGNOSIS — M75.41 IMPINGEMENT SYNDROME OF RIGHT SHOULDER: ICD-10-CM

## 2023-07-06 PROCEDURE — 99213 OFFICE O/P EST LOW 20 MIN: CPT

## 2023-07-06 NOTE — HISTORY OF PRESENT ILLNESS
[de-identified] : 73 year old RHD male presents for a right shoulder follow-up evaluation. He is  s/p right shoulder arthroscopy with biceps release done on 2/28/23. He notes he has stop attending PT about 6 weeks  ago because he feels he has made a lot of progress.  He is doing well and is back to the gym.  He notes no cramping of his biceps and no shoulder pain.  He is aware that he should avoid overhead weight lifting.\par He has hx of cortisone injections in both shoulders: R subacromial 11/07/22; R intraarticular 9/19/22; L subacromial 5/18/22. \par \par Radiology Results from 03/06/2023:\par o Right Shoulder : AP internal/external rotation and outlet views were obtained and revealed concentric reduction of the glenohumeral joint with excellent subacromial decompression and degenerative arthritis of the AC joint. \par \par MRI of the Left Shoulder done at [facility] on 01/12/2023 revealed: \par 1.  Moderate rotator cuff tendinosis with intrasubstance tearing of the supraspinatus and infraspinatus, greatest within the infraspinatus with extension into the myotendinous junction. Bursal surface fraying is also noted.\par 2.  Moderate to severe glenohumeral cartilage loss.\par \par Radiology Results from 5/18/2022:\par o Left Shoulder : AP IR/ER and lateral views were obtained and reveal sclerosis of the greater tuberosity with degenerative arthritis of the AC joint, no lytic lesions.

## 2023-07-06 NOTE — ADDENDUM
[FreeTextEntry1] : I, SIMZACHARY DOVE, acted solely as a scribe for Dr. Damian Kohli on this date 07/06/2023.\par \par All medical record entries made by the Scribe were at my, Dr. Damian Kohli, direction and personally dictated by me on 07/06/2023. I have reviewed the chart and agree that the record accurately reflects my personal performance of the history, physical exam, assessment and plan. I have also personally directed, reviewed, and agreed with the chart.

## 2023-07-06 NOTE — PHYSICAL EXAM
[de-identified] : Constitutional\par o Appearance : well-nourished, well developed, alert, in no acute distress \par Head and Face\par o Head :\par ¦ Inspection : atraumatic, normocephalic\par Neurologic\par o Mental Status Examination :\par ¦ Orientation : alert and oriented X 3\par Psychiatric\par o Mood and Affect: mood normal, affect appropriate \par Cardiovascular\par o Observation/Palpation : - no swelling,normal pulses, normal temperature \par Lymphatic\par o Additional Nodes : No palpable lymph nodes present \par \par Cervical Spine\par o Inspection/Palpation :\par ¦ Inspection : alignment midline, normal degree of lordosis present\par ¦ Skin : normal appearance, no masses or tenderness, trachea midline\par ¦ Palpation : musculature is nontender to palpation\par o Range of Motion : arc of motion full in all planes, no crepitance or pain with ROM\par o Tests: Negative Spurling’s test \par \par Right Upper Extremity\par o Shoulder :\par ¦ Inspection/Palpation : no anterior capsular tenderness, well-healed arthroscopic portals, low lying biceps \par ¦ Range of Motion : mild discomfort with full forward flexion, limited IR bilaterally and 1 vertebral more on the right, full ER\par ¦ Strength : forward elevation 5/5, IR 5/5, ER 5/5, ER at 90° of abduction 5/5, supraspinatus 5/5, adduction 5/5, abduction 5/5, biceps/triceps 5/5,  5/5 \par ¦ Stability : no joint instability on provocative testing \par ¦ Tests: Lopez negative, Neer negative, Geronimo negative, negative drop arm test, Bailey's test negative \par \par Left Upper Extremity\par o Shoulder :\par ¦ Inspection/Palpation : no anterior capsular tenderness, no AC joint tenderness, no swelling or deformities\par ¦ Range of Motion : full forward flexion, limited IR bilaterally and 1 vertebral more on the right, full ER\par ¦ Strength : forward elevation 5/5, internal rotation 5/5, external rotation 5/5, external rotation at 90° of abduction 5/5, supraspinatus 5/5, adduction and abduction 5/5, biceps/triceps 5/5,  strength 5/5\par ¦ Stability : no joint instability on provocative testing\par ¦ Tests: Lopez negative, Neer negative, Geronimo negative, negative drop arm test, Bailey's test negative \par \par Gait: normal gait, no significant extremity swelling or lymphedema

## 2023-07-06 NOTE — REASON FOR VISIT
[Post Operative Visit] : a post operative visit for [FreeTextEntry2] : s/p EUA, arthroscopy right shoulder, biceps release and tenodesis, debridement of glenohumeral joint and labral tear with subacromial decompression. DOS: 2/28/23.

## 2023-07-06 NOTE — DISCUSSION/SUMMARY
[de-identified] : I went over the pathophysiology of the patient's symptoms in great detail with the patient. At-home strengthening exercises were discussed and demonstrated with the patient. We advised that he keeps exercising using light weight and high reps and avoid overhead exercise . \par \par All of their questions were answered. They understand and consent to the plan. \par \par FU in 3 months.

## 2023-07-18 ENCOUNTER — OFFICE (OUTPATIENT)
Dept: URBAN - METROPOLITAN AREA CLINIC 70 | Facility: CLINIC | Age: 73
Setting detail: OPHTHALMOLOGY
End: 2023-07-18
Payer: MEDICARE

## 2023-07-18 DIAGNOSIS — H26.493: ICD-10-CM

## 2023-07-18 DIAGNOSIS — Z96.1: ICD-10-CM

## 2023-07-18 DIAGNOSIS — H40.013: ICD-10-CM

## 2023-07-18 DIAGNOSIS — H16.223: ICD-10-CM

## 2023-07-18 DIAGNOSIS — H11.042: ICD-10-CM

## 2023-07-18 DIAGNOSIS — H35.54: ICD-10-CM

## 2023-07-18 PROCEDURE — 99213 OFFICE O/P EST LOW 20 MIN: CPT | Performed by: OPHTHALMOLOGY

## 2023-07-18 ASSESSMENT — SPHEQUIV_DERIVED
OD_SPHEQUIV: 0.625
OS_SPHEQUIV: 0.375

## 2023-07-18 ASSESSMENT — KERATOMETRY
OS_AXISANGLE_DEGREES: 167
OS_K1POWER_DIOPTERS: 41.50
OD_AXISANGLE_DEGREES: 006
OD_K2POWER_DIOPTERS: 42.00
OS_K2POWER_DIOPTERS: 42.25
OD_K1POWER_DIOPTERS: 41.25

## 2023-07-18 ASSESSMENT — REFRACTION_CURRENTRX
OS_SPHERE: +1.25
OD_SPHERE: +1.25
OS_OVR_VA: 20/
OD_CYLINDER: SPH
OS_CYLINDER: SPH
OD_OVR_VA: 20/

## 2023-07-18 ASSESSMENT — REFRACTION_AUTOREFRACTION
OS_CYLINDER: -1.25
OD_AXIS: 083
OS_SPHERE: +1.00
OD_CYLINDER: -0.75
OD_SPHERE: +1.00
OS_AXIS: 076

## 2023-07-18 ASSESSMENT — AXIALLENGTH_DERIVED
OD_AL: 24.0457
OS_AL: 24.0515

## 2023-07-18 ASSESSMENT — SUPERFICIAL PUNCTATE KERATITIS (SPK)
OD_SPK: 1+
OS_SPK: 1+

## 2023-07-18 ASSESSMENT — CONFRONTATIONAL VISUAL FIELD TEST (CVF)
OS_FINDINGS: FULL
OD_FINDINGS: FULL

## 2023-07-18 ASSESSMENT — CORNEAL PTERYGIUM: OS_PTERYGIUM: 1MM

## 2023-07-18 ASSESSMENT — VISUAL ACUITY
OD_BCVA: 20/25+1
OS_BCVA: 20/25+2

## 2023-08-08 ENCOUNTER — APPOINTMENT (OUTPATIENT)
Dept: OTOLARYNGOLOGY | Facility: CLINIC | Age: 73
End: 2023-08-08
Payer: MEDICARE

## 2023-08-08 VITALS
HEART RATE: 83 BPM | HEIGHT: 73 IN | DIASTOLIC BLOOD PRESSURE: 87 MMHG | BODY MASS INDEX: 26.51 KG/M2 | WEIGHT: 200 LBS | SYSTOLIC BLOOD PRESSURE: 136 MMHG

## 2023-08-08 PROCEDURE — 31575 DIAGNOSTIC LARYNGOSCOPY: CPT

## 2023-08-08 PROCEDURE — 99214 OFFICE O/P EST MOD 30 MIN: CPT | Mod: 25

## 2023-08-08 RX ORDER — RIVAROXABAN 2.5 MG/1
TABLET, FILM COATED ORAL
Refills: 0 | Status: COMPLETED | COMMUNITY
End: 2023-08-08

## 2023-08-08 RX ORDER — ASPIRIN 81 MG
81 TABLET, DELAYED RELEASE (ENTERIC COATED) ORAL
Refills: 0 | Status: ACTIVE | COMMUNITY

## 2023-08-08 NOTE — CONSULT LETTER
[Dear  ___] : Dear  [unfilled], [Courtesy Letter:] : I had the pleasure of seeing your patient, [unfilled], in my office today. [Please see my note below.] : Please see my note below. [Consult Closing:] : Thank you very much for allowing me to participate in the care of this patient.  If you have any questions, please do not hesitate to contact me. [Sincerely,] : Sincerely, [DrKike  ___] : Dr. FERNANDO [DrKike ___] : Dr. FERNANDO [FreeTextEntry2] : David Carrera MD  [FreeTextEntry3] : Ant Denson MD, FACS\par  Chief of Otolaryngology and Head & Neck Surgery\par  [facility]\par   - Dept. of Otolaryngology\par  [employer]\par

## 2023-08-08 NOTE — PROCEDURE
[Unable to Cooperate with Mirror] : patient unable to cooperate with mirror [None] : none [Flexible Endoscope] : examined with the flexible endoscope [Serial Number: ___] : Serial Number: [unfilled] [True Vocal Cords Paralysis] : no true vocal cord paralysis [True Vocal Cords Erythematous] : no true vocal cord edema [True Vocal Cords Garces's Nodules] : no true vocal cord nodules [Glottis Arytenoid Cartilages] : no arytenoid granulomas [Glottis Arytenoid Cartilages Erythema] : no arytenoid erythema [Normal] : posterior cricoid area had healthy pink mucosa in the interarytenoid area and the esophageal inlet [de-identified] : Surgilube

## 2023-08-08 NOTE — HISTORY OF PRESENT ILLNESS
[de-identified] : 73 year old male with history of T1N0M0 SCCa of the larynx s/p RT with Dr. Patterson on August 4, 2021. Presents today for 6 month follow up evaluation. Reports occasional voice hoarseness and throat clearing sensation. Denies dysphagia, pain with swallow and dyspnea.

## 2023-08-08 NOTE — ASSESSMENT
[FreeTextEntry1] : Pt remains SUZI.  He will see Dr. Patterson in October and will f/u with me in 6 months.

## 2023-10-09 ENCOUNTER — APPOINTMENT (OUTPATIENT)
Dept: ORTHOPEDIC SURGERY | Facility: CLINIC | Age: 73
End: 2023-10-09
Payer: MEDICARE

## 2023-10-09 VITALS — WEIGHT: 200 LBS | BODY MASS INDEX: 26.51 KG/M2 | HEIGHT: 73 IN

## 2023-10-09 DIAGNOSIS — M19.019 PRIMARY OSTEOARTHRITIS, UNSPECIFIED SHOULDER: ICD-10-CM

## 2023-10-09 DIAGNOSIS — M75.21 BICIPITAL TENDINITIS, RIGHT SHOULDER: ICD-10-CM

## 2023-10-09 PROCEDURE — 99213 OFFICE O/P EST LOW 20 MIN: CPT

## 2023-10-16 ENCOUNTER — APPOINTMENT (OUTPATIENT)
Dept: RADIATION ONCOLOGY | Facility: CLINIC | Age: 73
End: 2023-10-16
Payer: MEDICARE

## 2023-10-16 VITALS
OXYGEN SATURATION: 100 % | BODY MASS INDEX: 27.22 KG/M2 | DIASTOLIC BLOOD PRESSURE: 71 MMHG | WEIGHT: 205.36 LBS | HEIGHT: 73 IN | RESPIRATION RATE: 18 BRPM | HEART RATE: 75 BPM | SYSTOLIC BLOOD PRESSURE: 122 MMHG | TEMPERATURE: 97.7 F

## 2023-10-16 PROCEDURE — 99213 OFFICE O/P EST LOW 20 MIN: CPT

## 2024-01-17 DIAGNOSIS — Z00.00 ENCOUNTER FOR GENERAL ADULT MEDICAL EXAMINATION W/OUT ABNORMAL FINDINGS: ICD-10-CM

## 2024-01-18 ENCOUNTER — APPOINTMENT (OUTPATIENT)
Dept: ORTHOPEDIC SURGERY | Facility: CLINIC | Age: 74
End: 2024-01-18
Payer: MEDICARE

## 2024-01-18 VITALS — BODY MASS INDEX: 27.83 KG/M2 | WEIGHT: 210 LBS | HEIGHT: 73 IN

## 2024-01-18 DIAGNOSIS — M25.552 PAIN IN LEFT HIP: ICD-10-CM

## 2024-01-18 PROCEDURE — 73502 X-RAY EXAM HIP UNI 2-3 VIEWS: CPT | Mod: LT

## 2024-01-18 PROCEDURE — 99214 OFFICE O/P EST MOD 30 MIN: CPT

## 2024-01-18 NOTE — HISTORY OF PRESENT ILLNESS
[de-identified] : This is very nice 73-year-old gentleman experiencing left buttock pain, which is severe in intensity. The pain somewhat limits activities of daily living. Walking tolerance is somewhat reduced.  No pain in the groin.  History of low back pain.  Intermittent ibuprofen helps.  Not using a cane or walker.  The patient denies any radiation of the pain to the feet and it is not associated with numbness, tingling, or weakness.

## 2024-01-18 NOTE — DISCUSSION/SUMMARY
[de-identified] : This patient has extra-articular left hip pain.  It seems related to his known lumbar spine pain.  The patient is not an appropriate candidate for surgical intervention at this time. An extensive discussion was conducted on the natural history of the disease and the variety of surgical and non-surgical options available to the patient including, but not limited to non-steroidal anti-inflammatory medications, steroid injections, physical therapy, maintenance of ideal body weight, and reduction of activity.  Refused a prescription for Mobic instead opting for over-the-counter NSAIDs.  He will do home exercise program.  Referred to physiatry. The patient will schedule an appointment as needed.

## 2024-01-18 NOTE — PHYSICAL EXAM
[de-identified] : Patient is well nourished, well-developed, in no acute distress, with appropriate mood and affect. The patient is oriented to time, place, and person. Respirations are even and unlabored. Gait evaluation does not reveal a limp. There is no inguinal adenopathy. Examination of the contralateral hip shows normal range of motion, strength, no tenderness, and intact skin. The affected limb is well-perfused and showed 2+ dp/pt pulses, without skin lesions, shows a grossly normal motor and sensory examination. Examination of the hip shows no skin lesions. Hip motion is full and painless from 0-90 degrees extension to flexion, 20 degrees adduction and 20 degrees abduction, and 15 degrees internal and 30 degrees external rotation. Leg lengths are approximately equal. FADIR is negative and NOEMY is negative. Stinchfield test is negative. Both hips are stable and muscle strength is normal with good strength with resisted abduction and adduction. Pedal pulses are palpable. [de-identified] : AP and lateral x-rays of the left hip, pelvis, and femur were ordered and taken in the office and demonstrate no evidence of degenerative joint disease of the hip with maintained joint space and no evidence of fractures or other intraarticular pathology.

## 2024-02-05 ENCOUNTER — OUTPATIENT (OUTPATIENT)
Dept: OUTPATIENT SERVICES | Facility: HOSPITAL | Age: 74
LOS: 1 days | End: 2024-02-05
Payer: MEDICARE

## 2024-02-05 ENCOUNTER — APPOINTMENT (OUTPATIENT)
Dept: CT IMAGING | Facility: CLINIC | Age: 74
End: 2024-02-05
Payer: MEDICARE

## 2024-02-05 DIAGNOSIS — C32.0 MALIGNANT NEOPLASM OF GLOTTIS: ICD-10-CM

## 2024-02-05 DIAGNOSIS — Z95.5 PRESENCE OF CORONARY ANGIOPLASTY IMPLANT AND GRAFT: Chronic | ICD-10-CM

## 2024-02-05 DIAGNOSIS — Z98.42 CATARACT EXTRACTION STATUS, LEFT EYE: Chronic | ICD-10-CM

## 2024-02-05 DIAGNOSIS — Z98.49 CATARACT EXTRACTION STATUS, UNSPECIFIED EYE: Chronic | ICD-10-CM

## 2024-02-05 DIAGNOSIS — Z92.3 PERSONAL HISTORY OF IRRADIATION: Chronic | ICD-10-CM

## 2024-02-05 DIAGNOSIS — Z98.890 OTHER SPECIFIED POSTPROCEDURAL STATES: Chronic | ICD-10-CM

## 2024-02-05 PROCEDURE — 71260 CT THORAX DX C+: CPT | Mod: 26,MH

## 2024-02-05 PROCEDURE — 71260 CT THORAX DX C+: CPT

## 2024-02-06 ENCOUNTER — APPOINTMENT (OUTPATIENT)
Dept: OTOLARYNGOLOGY | Facility: CLINIC | Age: 74
End: 2024-02-06
Payer: MEDICARE

## 2024-02-06 VITALS
SYSTOLIC BLOOD PRESSURE: 122 MMHG | HEIGHT: 78 IN | WEIGHT: 205 LBS | RESPIRATION RATE: 17 BRPM | BODY MASS INDEX: 23.72 KG/M2 | DIASTOLIC BLOOD PRESSURE: 75 MMHG | HEART RATE: 72 BPM | OXYGEN SATURATION: 98 %

## 2024-02-06 DIAGNOSIS — C32.0 MALIGNANT NEOPLASM OF GLOTTIS: ICD-10-CM

## 2024-02-06 PROCEDURE — 31575 DIAGNOSTIC LARYNGOSCOPY: CPT

## 2024-02-06 PROCEDURE — 99214 OFFICE O/P EST MOD 30 MIN: CPT | Mod: 25

## 2024-02-06 NOTE — ASSESSMENT
[FreeTextEntry1] : Pt remains SUZI.  He will see Dr. Patterson in 6 months and will see me in a year.

## 2024-02-06 NOTE — PROCEDURE
[Unable to Cooperate with Mirror] : patient unable to cooperate with mirror [None] : none [Flexible Endoscope] : examined with the flexible endoscope [Serial Number: ___] : Serial Number: [unfilled] [True Vocal Cords Paralysis] : no true vocal cord paralysis [True Vocal Cords Erythematous] : no true vocal cord edema [True Vocal Cords Garces's Nodules] : no true vocal cord nodules [Glottis Arytenoid Cartilages] : no arytenoid granulomas [Glottis Arytenoid Cartilages Erythema] : no arytenoid erythema [Normal] : posterior cricoid area had healthy pink mucosa in the interarytenoid area and the esophageal inlet [de-identified] : Surgilube

## 2024-02-06 NOTE — CONSULT LETTER
[Dear  ___] : Dear  [unfilled], [Courtesy Letter:] : I had the pleasure of seeing your patient, [unfilled], in my office today. [Please see my note below.] : Please see my note below. [Consult Closing:] : Thank you very much for allowing me to participate in the care of this patient.  If you have any questions, please do not hesitate to contact me. [Sincerely,] : Sincerely, [DrKike  ___] : Dr. FERNANDO [FreeTextEntry2] : David Carrera MD  [FreeTextEntry3] : Ant Denson MD, FACS\par  Chief of Otolaryngology and Head & Neck Surgery\par  [facility]\par   - Dept. of Otolaryngology\par  [facility] [employer]\par   [DrKike ___] : Dr. FERNANDO

## 2024-02-06 NOTE — HISTORY OF PRESENT ILLNESS
[de-identified] : 73 year old male with history of T1N0M0 SCCa of the larynx s/p RT with Dr. Patterson on August 4, 2021. Presents today for 6 month follow up evaluation. Reports occasional voice hoarseness and throat clearing. Occasionally has phlegm in throat worse in the morning. Denies dysphagia, pain with swallow, neck swelling, coughing up blood and dyspnea. Saw Dr. Riojas 10/16/23 Completed CT Chest and Neck yesterday, results pending.

## 2024-02-28 ENCOUNTER — APPOINTMENT (OUTPATIENT)
Dept: MRI IMAGING | Facility: CLINIC | Age: 74
End: 2024-02-28
Payer: MEDICARE

## 2024-02-28 PROCEDURE — 72148 MRI LUMBAR SPINE W/O DYE: CPT | Mod: 26,MH

## 2024-03-19 NOTE — PATIENT PROFILE ADULT - HAVE YOU EXPERIENCED VIOLENCE OR A TRAUMATIC EVENT?
Last ov- 10/16/2023 with Dr. Izquierdo   No f/u interval noted  Patient does not have upcoming appt scheduled nish   no

## 2024-08-07 ENCOUNTER — OFFICE (OUTPATIENT)
Dept: URBAN - METROPOLITAN AREA CLINIC 70 | Facility: CLINIC | Age: 74
Setting detail: OPHTHALMOLOGY
End: 2024-08-07
Payer: MEDICARE

## 2024-08-07 DIAGNOSIS — H40.013: ICD-10-CM

## 2024-08-07 DIAGNOSIS — H11.042: ICD-10-CM

## 2024-08-07 DIAGNOSIS — H35.54: ICD-10-CM

## 2024-08-07 DIAGNOSIS — H16.223: ICD-10-CM

## 2024-08-07 DIAGNOSIS — Z96.1: ICD-10-CM

## 2024-08-07 DIAGNOSIS — H26.493: ICD-10-CM

## 2024-08-07 PROCEDURE — 92014 COMPRE OPH EXAM EST PT 1/>: CPT | Performed by: OPHTHALMOLOGY

## 2024-08-07 PROCEDURE — 92250 FUNDUS PHOTOGRAPHY W/I&R: CPT | Performed by: OPHTHALMOLOGY

## 2024-08-07 ASSESSMENT — CONFRONTATIONAL VISUAL FIELD TEST (CVF)
OS_FINDINGS: FULL
OD_FINDINGS: FULL

## 2024-10-14 ENCOUNTER — OUTPATIENT (OUTPATIENT)
Dept: OUTPATIENT SERVICES | Facility: HOSPITAL | Age: 74
LOS: 1 days | End: 2024-10-14
Payer: MEDICARE

## 2024-10-14 ENCOUNTER — APPOINTMENT (OUTPATIENT)
Dept: CT IMAGING | Facility: CLINIC | Age: 74
End: 2024-10-14
Payer: MEDICARE

## 2024-10-14 DIAGNOSIS — Z95.5 PRESENCE OF CORONARY ANGIOPLASTY IMPLANT AND GRAFT: Chronic | ICD-10-CM

## 2024-10-14 DIAGNOSIS — Z98.49 CATARACT EXTRACTION STATUS, UNSPECIFIED EYE: Chronic | ICD-10-CM

## 2024-10-14 DIAGNOSIS — Z98.890 OTHER SPECIFIED POSTPROCEDURAL STATES: Chronic | ICD-10-CM

## 2024-10-14 DIAGNOSIS — Z92.3 PERSONAL HISTORY OF IRRADIATION: Chronic | ICD-10-CM

## 2024-10-14 DIAGNOSIS — Z00.8 ENCOUNTER FOR OTHER GENERAL EXAMINATION: ICD-10-CM

## 2024-10-14 DIAGNOSIS — Z98.42 CATARACT EXTRACTION STATUS, LEFT EYE: Chronic | ICD-10-CM

## 2024-10-14 PROCEDURE — 71260 CT THORAX DX C+: CPT | Mod: 26,MH

## 2024-10-14 PROCEDURE — 70491 CT SOFT TISSUE NECK W/DYE: CPT | Mod: 26,MH

## 2024-10-17 ENCOUNTER — APPOINTMENT (OUTPATIENT)
Dept: RADIATION ONCOLOGY | Facility: CLINIC | Age: 74
End: 2024-10-17

## 2024-10-17 VITALS
WEIGHT: 205 LBS | HEIGHT: 73 IN | HEART RATE: 82 BPM | SYSTOLIC BLOOD PRESSURE: 125 MMHG | DIASTOLIC BLOOD PRESSURE: 73 MMHG | BODY MASS INDEX: 27.17 KG/M2 | RESPIRATION RATE: 17 BRPM | OXYGEN SATURATION: 99 %

## 2024-10-17 PROCEDURE — 99213 OFFICE O/P EST LOW 20 MIN: CPT | Mod: GC

## 2024-11-20 PROCEDURE — 71260 CT THORAX DX C+: CPT

## 2024-11-20 PROCEDURE — 70491 CT SOFT TISSUE NECK W/DYE: CPT

## 2024-12-13 NOTE — REASON FOR VISIT
Cholesterol is stable. Repeat again in 6 months. Heart healthy diet.    Nutrition and Diet:  A diet emphasizing intake of vegetables, fruits, legumes, nuts, whole grains, and fish is recommended. A diet containing reduced amounts of cholesterol and sodium can be beneficial.  Replacement of saturated fat with dietary mono- and poly-unsaturated fats can be beneficial.  Minimizing the intake of trans fats, processed meats, refined carbohydrates, and sweetened beverages as part of a heart healthy diet.    Physical Activity:  Engage in at least 150 minutes per week of accumulated moderate intensity or 75 minutes per week of vigorous intensity aerobic physical activity (or an equivalent combination of moderate and vigorous activity)    Engaging in some moderate or vigorous intensity physical activity, even if less than this recommended amount, can be beneficial to reduce cardiovascular risk.    Intensity METS Examples   Sedentary Behavior* 1-1.5 Sitting, reclining, or lying; watching TV   Light 1.6-2.9 Walking slowly, cooking, light house work   Moderate 3.0-5.9 Brisk walking (2.4-4mph), biking 5-9mph, ballroom dancing, active yoga, recreational swimming   Vigorous >=6 Jogging/running, biking >=10mph, singles tennis, swimming laps     *Sedentary behavior is defined as any waking behavior characterized by an energy expenditure <=1.5 metabolic equivalents (METs), while in a sitting, reclining, or lying posture. Standing is a sedentary activity in that it involves <=1.5 METs, but is not considered a component of sedentary behavior; mph indicates miles per hour.    Orders:    CBC; Future    Lipid Panel with Direct LDL reflex; Future    Comprehensive metabolic panel; Future       Most recent A1c was 6.0 % on 6/12/2024. Watch carbohydrate intake. Continue walking regimen. Great job with weight loss. Repeat in 6 months.    Orders:    Hemoglobin A1C; Future     [Subsequent Evaluation] : a subsequent evaluation for [FreeTextEntry2] : raspy voice s/p direct laryngoscopy and biopsy Normotensive  monitor BP

## 2025-02-11 ENCOUNTER — APPOINTMENT (OUTPATIENT)
Dept: OTOLARYNGOLOGY | Facility: CLINIC | Age: 75
End: 2025-02-11
Payer: MEDICARE

## 2025-02-11 ENCOUNTER — NON-APPOINTMENT (OUTPATIENT)
Age: 75
End: 2025-02-11

## 2025-02-11 VITALS
WEIGHT: 200 LBS | SYSTOLIC BLOOD PRESSURE: 121 MMHG | HEART RATE: 94 BPM | DIASTOLIC BLOOD PRESSURE: 76 MMHG | BODY MASS INDEX: 26.51 KG/M2 | HEIGHT: 73 IN

## 2025-02-11 DIAGNOSIS — C32.0 MALIGNANT NEOPLASM OF GLOTTIS: ICD-10-CM

## 2025-02-11 DIAGNOSIS — R13.14 DYSPHAGIA, PHARYNGOESOPHAGEAL PHASE: ICD-10-CM

## 2025-02-11 PROCEDURE — 99214 OFFICE O/P EST MOD 30 MIN: CPT | Mod: 25

## 2025-02-11 PROCEDURE — 31575 DIAGNOSTIC LARYNGOSCOPY: CPT

## 2025-04-24 NOTE — ED PROVIDER NOTE - CADM POA CENTRAL LINE
[facility] Medicine  Progress Note    Patient Name: Claudia Sierra  MRN: [id number]  Patient Class: IP- Inpatient   Admission Date: 4/21/2025  Length of Stay: 2 days  Attending Physician: Eris Chu MD  Primary Care Provider: Sajan Curtis MD        Subjective     Principal Problem:Acute hypoxic respiratory failure        HPI:  Patient is an 86-year-old female with a history of breast CA, HTN, Afib presented to ED today for shortness for breath which has been ongoing for the last 3 days.  Patient reports she went to urgent Care today had a chest x-ray and was sent home on  antibiotics however she did not start these and presented to ED for no improvement.  Patient  temperature was 103° currently in Afib which is chronic and she is on Xarelto.  Labwork remarkable for sodium 133, potassium 3.1.  BNP 5 5 9, troponin elevated at 0.059.  She denies any chest pain.  UA negative COVID and influenza negative.  Chest x-ray with interstitial changes.  Patient is started on Rocephin and azithromycin.  Patient will be admitted to Hospital Medicine.    Overview/Hospital Course:  No notes on file    Interval History:  Patient was seen in the morning continued to be wheezing with acute distress, on 2 L nasal cannula echo was done showed picture of pulmonary hypertension.  Patient to continue on diuresis urology is on board, on 04/23 patient was switched into broad-spectrum antibiotics to cover her pneumonia.  With the scheduled breathing treatments q.4 hours      Review of Systems   Constitutional:  Positive for activity change, appetite change and fatigue.   HENT: Negative.     Respiratory:  Positive for cough, shortness of breath and wheezing.    Cardiovascular:  Negative for chest pain, palpitations and leg swelling.   Gastrointestinal: Negative.    Genitourinary: Negative.    Musculoskeletal: Negative.    Neurological: Negative.    Psychiatric/Behavioral:  Negative for agitation. The  patient is nervous/anxious.      Objective:     Vital Signs (Most Recent):  Temp: 97.4 °F (36.3 °C) (04/24/25 1124)  Pulse: 90 (04/24/25 1215)  Resp: 16 (04/24/25 1215)  BP: 108/66 (04/24/25 1124)  SpO2: 96 % (04/24/25 1215) Vital Signs (24h Range):  Temp:  [97.4 °F (36.3 °C)-98.3 °F (36.8 °C)] 97.4 °F (36.3 °C)  Pulse:  [] 90  Resp:  [16-20] 16  SpO2:  [96 %-100 %] 96 %  BP: ()/(52-72) 108/66     Weight: 59.9 kg (132 lb)  Body mass index is 26.66 kg/m².    Intake/Output Summary (Last 24 hours) at 4/24/2025 1450  Last data filed at 4/24/2025 1257  Gross per 24 hour   Intake 370 ml   Output 1600 ml   Net -1230 ml         Physical Exam  Constitutional:       General: She is in acute distress.   HENT:      Head: Normocephalic and atraumatic.      Nose: Nose normal.      Mouth/Throat:      Mouth: Mucous membranes are moist.   Eyes:      Extraocular Movements: Extraocular movements intact.      Pupils: Pupils are equal, round, and reactive to light.   Cardiovascular:      Rate and Rhythm: Normal rate. Rhythm irregular.      Pulses: Normal pulses.      Heart sounds: Normal heart sounds.   Pulmonary:      Effort: Respiratory distress present.      Breath sounds: Wheezing present.      Comments: On 2 L nasal cannula  Abdominal:      Palpations: Abdomen is soft.   Musculoskeletal:      Right lower leg: No edema.      Left lower leg: No edema.   Skin:     Capillary Refill: Capillary refill takes 2 to 3 seconds.      Coloration: Skin is pale.   Neurological:      Mental Status: She is alert and oriented to person, place, and time.   Psychiatric:         Mood and Affect: Mood normal.               Significant Labs: All pertinent labs within the past 24 hours have been reviewed.  Recent Lab Results  (Last 5 results in the past 24 hours)        04/24/25  1121   04/24/25  0651   04/24/25  0621   04/23/25  2352   04/23/25  1646        Albumin   3.0             ALP   50             ALT   23             Anion Gap   11              AST   55             Baso #   0.01             Basophil %   0.1             BILIRUBIN TOTAL   0.8  Comment: For infants and newborns, interpretation of results should be based   on gestational age, weight and in agreement with clinical   observations.    Premature Infant recommended reference ranges:   0-24 hours:  <8.0 mg/dL   24-48 hours: <12.0 mg/dL   3-5 days:    <15.0 mg/dL   6-29 days:   <15.0 mg/dL             BUN   27             Calcium   8.9             Chloride   93             CO2   25             Creatinine   1.0             eGFR   55  Comment: Estimated GFR calculated using the CKD-EPI creatinine (2021) equation.             Eos #   0.00             Eos %   0.0             Glucose   143             GRAM STAIN         No WBCs  [P]                No organisms seen  [P]       Gran # (ANC)   7.12             Hematocrit   35.0             Hemoglobin   11.8             Immature Grans (Abs)   0.05  Comment: Mild elevation in immature granulocytes is non specific and can be seen in a variety of conditions including stress response, acute inflammation, trauma and pregnancy. Correlation with other laboratory and clinical findings is essential.             Immature Granulocytes   0.5             Lymph #   1.24             Lymph %   13.3             MCH   31.6             MCHC   33.7             MCV   94             Mono #   0.90             Mono %   9.7             MPV   10.1             MRSA SCREEN BY PCR               Neut %   76.4             nRBC   0             Platelet Count   182             POCT Glucose 133     163   175         Potassium   3.8             PROTEIN TOTAL   6.7             RBC   3.74             RDW   15.1             Sodium   129             WBC   9.32                                     [P] - Preliminary Result               Significant Imaging: I have reviewed all pertinent imaging results/findings within the past 24 hours.    Imaging Results              X-Ray Chest AP Portable  (Final result)  Result time 04/21/25 21:58:30      Final result by Oren Dunn DO (04/21/25 21:58:30)                   Impression:      No acute abnormality.      Electronically signed by: Oren Dunn  Date:    04/21/2025  Time:    21:58               Narrative:    EXAMINATION:  XR CHEST AP PORTABLE    CLINICAL HISTORY:  Sepsis;    TECHNIQUE:  Single frontal view of the chest was performed.    COMPARISON:  10/21/2024.    FINDINGS:  Coarse interstitial thickening again noted, stable.  No new focal consolidation.  The pleural spaces are clear.  The cardiac silhouette is borderline enlarged.  There are calcifications of the aortic arch.  Osseous structures demonstrate degenerative changes and osteopenia.  There are surgical clips.                                  Echo  Result Date: 4/23/2025    Left Ventricle: There is low normal systolic function with a visually   estimated ejection fraction of 50 - 55%. Unable to assess diastolic   function due to atrial fibrillation.    Right Ventricle: The right ventricle is normal in size. Systolic   function is mildly reduced.    Left Atrium: dilated    Right Atrium: Right atrium is mildly dilated.    Aortic Valve: There is moderate aortic valve sclerosis. Moderately   restricted motion. There is moderate stenosis. Aortic valve area by VTI is   1.1 cm². Aortic valve peak velocity is 2.1 m/s. Mean gradient is 10 mmHg.   The dimensionless index is 0.35.    Mitral Valve: There is moderate mitral annular calcification. There is   mild regurgitation.    Tricuspid Valve: There is moderate regurgitation.    Pulmonic Valve: There is mild regurgitation.    Pulmonary Artery: There is pulmonary hypertension. The estimated   pulmonary artery systolic pressure is 75 mmHg.    IVC/SVC: Intermediate venous pressure at 8 mmHg.        Echo  Result Date: 10/10/2024    Left Ventricle: The left ventricle is normal in size. Normal wall   thickness. There is normal systolic function with a  visually estimated   ejection fraction of 55 - 60%. Unable to assess diastolic function due to   atrial fibrillation.    Right Ventricle: Normal right ventricular cavity size. Wall thickness   is normal. Systolic function is normal.    Left Atrium: Left atrium is severely dilated.    Right Atrium: Right atrium is severely dilated.    Aortic Valve: The aortic valve is a trileaflet valve. There is moderate   aortic valve sclerosis. Mildly restricted motion. There is mild stenosis.   Aortic valve area by VTI is 2.6 cm2. Aortic valve peak velocity is 1.8   m/s. Mean gradient is 7.3 mmHg. The dimensionless index is 0.92.    Mitral Valve: There is mild regurgitation.    Tricuspid Valve: There is severe regurgitation.    Pulmonary Artery: The estimated pulmonary artery systolic pressure is   64 mmHg.    IVC/SVC: Elevated venous pressure at 15 mmHg.        Echo  Result Date: 4/25/2024    Left Ventricle: The left ventricle is normal in size. There is   concentric remodeling. There is normal systolic function with a visually   estimated ejection fraction of 55 - 60%. Biplane (2D) method of discs   ejection fraction is 59%. Unable to assess diastolic function due to   atrial fibrillation.    Right Ventricle: Normal right ventricular cavity size. Systolic   function is reduced.TAPSE is 1.05 cm.    Left Atrium: Left atrium is mildly dilated. The left atrium volume   index is 36.1 mL/m2.    Right Atrium: Right atrium is mildly dilated.    Aortic Valve: There is moderate aortic valve sclerosis. Mildly   restricted motion. There is mild to moderate stenosis. Aortic valve area   by VTI is 0.99 cm². Aortic valve area by velocity is 1.02 cm². Aortic   valve peak velocity is 1.66 m/s. Mean gradient is 7 mmHg. The   dimensionless index is 0.33.    Tricuspid Valve: There is moderate regurgitation.    Pulmonic Valve: There is mild regurgitation.    Pulmonary Artery: The estimated pulmonary artery systolic pressure is   53 mmHg.     IVC/SVC: Intermediate venous pressure at 8 mmHg.      Echo  Result Date: 4/23/2025    Left Ventricle: There is low normal systolic function with a visually   estimated ejection fraction of 50 - 55%. Unable to assess diastolic   function due to atrial fibrillation.    Right Ventricle: The right ventricle is normal in size. Systolic   function is mildly reduced.    Left Atrium: dilated    Right Atrium: Right atrium is mildly dilated.    Aortic Valve: There is moderate aortic valve sclerosis. Moderately   restricted motion. There is moderate stenosis. Aortic valve area by VTI is   1.1 cm². Aortic valve peak velocity is 2.1 m/s. Mean gradient is 10 mmHg.   The dimensionless index is 0.35.    Mitral Valve: There is moderate mitral annular calcification. There is   mild regurgitation.    Tricuspid Valve: There is moderate regurgitation.    Pulmonic Valve: There is mild regurgitation.    Pulmonary Artery: There is pulmonary hypertension. The estimated   pulmonary artery systolic pressure is 75 mmHg.    IVC/SVC: Intermediate venous pressure at 8 mmHg.            Assessment & Plan  Essential hypertension  Patient's blood pressure range in the last 24 hours was: BP  Min: 91/61  Max: 122/66.The patient's inpatient anti-hypertensive regimen is listed below:  Current Antihypertensives  metoprolol succinate (TOPROL-XL) 24 hr tablet 50 mg, Daily, Oral  metoprolol injection 5 mg, Every 6 hours PRN, Intravenous  spironolactone tablet 25 mg, Daily, Oral  furosemide injection 40 mg, Daily, Intravenous    Plan  - BP is controlled, no changes needed to their regimen  -   CAP (community acquired pneumonia)  Patient has a diagnosis of pneumonia. The cause of the pneumonia is unknown at this time. The pneumonia is worsening due to hypoxia . The patient has the following signs/symptoms of pneumonia: persistent hypoxia , cough, and shortness of breath. The patient does have a current oxygen requirement and the patient does not have a home  oxygen requirement. I have reviewed the pertinent imaging. The following cultures have been collected: Blood cultures The culture results are listed below.     Current antimicrobial regimen consists of the antibiotics listed below. Will monitor patient closely and continue current treatment plan unchanged.     Continue antibiotics   Supplemental O2      Antibiotics (From admission, onward)      Start     Stop Route Frequency Ordered    04/23/25 1145  piperacillin-tazobactam (ZOSYN) 4.5 g in D5W 100 mL IVPB (MB+)         -- IV Every 8 hours (non-standard times) 04/23/25 1033            Microbiology Results (last 7 days)       Procedure Component Value Units Date/Time    Culture, Respiratory with Gram Stain [[id number]] Collected: 04/23/25 1646    Order Status: Completed Specimen: Respiratory from Sputum, Expectorated Updated: 04/24/25 1133     GRAM STAIN No WBCs      No organisms seen    MRSA Screen by PCR [[id number]]  (Normal) Collected: 04/23/25 1505    Order Status: Completed Specimen: Nasal Swab Updated: 04/24/25 0429     MRSA PCR Screen Not Detected    Blood culture x two cultures. Draw prior to antibiotics. [[id number]]  (Normal) Collected: 04/21/25 2123    Order Status: Completed Specimen: Blood from Peripheral, Antecubital, Left Updated: 04/24/25 0419     Blood Culture No Growth After 48 Hours    Blood culture x two cultures. Draw prior to antibiotics. [[id number]]  (Normal) Collected: 04/21/25 2117    Order Status: Completed Specimen: Blood from Peripheral, Forearm, Left Updated: 04/24/25 0303     Blood Culture No Growth After 48 Hours    Influenza A & B by Molecular [[id number]]  (Normal) Collected: 04/21/25 2114    Order Status: Completed Specimen: Nasopharyngeal Swab Updated: 04/21/25 2149     INFLUENZA A MOLECULAR Negative     INFLUENZA B MOLECULAR  Negative          Atrial fibrillation with RVR  Patient has persistent (7 days or more) atrial fibrillation. Patient is currently in atrial fibrillation.  [id number] Score: 3. The patients heart rate in the last 24 hours is as follows:  Pulse  Min: 75  Max: 113     Antiarrhythmics  metoprolol succinate (TOPROL-XL) 24 hr tablet 50 mg, Daily, Oral  metoprolol injection 5 mg, Every 6 hours PRN, Intravenous    Anticoagulants  rivaroxaban tablet 15 mg, Daily, Oral    Plan  - Replete lytes with a goal of K>4, Mg >2  - Patient is anticoagulated, see medications listed above.  - Patient's afib is currently controlled        Hypokalemia  Patient's most recent potassium results are listed below.   Recent Labs     04/22/25  0525 04/23/25  0541 04/24/25  0651   K 3.0* 3.9 3.8     Plan  - Replete potassium per protocol  - Monitor potassium Every 12 hours  - Patient's hypokalemia is  monitored     Hyponatremia  Hyponatremia is likely due to SIADH secondary to pneumonia. The patient's most recent sodium results are listed below.  Recent Labs     04/22/25  0525 04/23/25  0541 04/24/25  0651   * 128* 129*     Plan  - Correct the sodium by 4-6mEq in 24 hours.    -hyponatremia secondary to SIADH, currently patient on fluid restriction less than 1500 mL per day  - Will treat the hyponatremia with to be monitored since it is a chronic  - Monitor sodium Daily.   - Patient hyponatremia is  monitored  Improving  Acute hypoxic respiratory failure  Patient with Hypoxic Respiratory failure which is Acute.  she is not on home oxygen. Supplemental oxygen was provided and noted- Oxygen Concentration (%):  [28] 28    .   Signs/symptoms of respiratory failure include- tachypnea, increased work of breathing, respiratory distress, and wheezing. Contributing diagnoses includes - CHF and Pneumonia Labs and images were reviewed. Patient Has not had a recent ABG. Will treat underlying causes and adjust management of respiratory failure as follows- IV antibiotics nasal cannula, echo ordered cardiology was consulted  Elevated troponin  Can be secondary to high demand ischemia  Ordered echocardiogram  that showed severe pulmonary hypertension with moderate mitral regurg  Cardiology consulted appreciate recommendations  No ST changes noted    Longstanding persistent atrial fibrillation  Patient has long standing persistent (>12 months) atrial fibrillation. Patient is currently in atrial fibrillation. [id number] Score: 3. The patients heart rate in the last 24 hours is as follows:  Pulse  Min: 75  Max: 113     Antiarrhythmics  metoprolol succinate (TOPROL-XL) 24 hr tablet 50 mg, Daily, Oral  metoprolol injection 5 mg, Every 6 hours PRN, Intravenous    Anticoagulants  rivaroxaban tablet 15 mg, Daily, Oral    Plan  - Replete lytes with a goal of K>4, Mg >2  - Patient is anticoagulated, see medications listed above.  - Patient's afib is currently controlled  - to be monitored closely  -cardiology is consulted      Nonrheumatic aortic valve stenosis  Echocardiogram with evidence of mitral regurgitation that is moderate . The patient's most recent echocardiogram result is listed below. We will manage the valvular abnormality by diuresing the patient, cardiology is on board.    Echo  Result Date: 4/23/2025    Left Ventricle: There is low normal systolic function with a visually   estimated ejection fraction of 50 - 55%. Unable to assess diastolic   function due to atrial fibrillation.    Right Ventricle: The right ventricle is normal in size. Systolic   function is mildly reduced.    Left Atrium: dilated    Right Atrium: Right atrium is mildly dilated.    Aortic Valve: There is moderate aortic valve sclerosis. Moderately   restricted motion. There is moderate stenosis. Aortic valve area by VTI is   1.1 cm². Aortic valve peak velocity is 2.1 m/s. Mean gradient is 10 mmHg.   The dimensionless index is 0.35.    Mitral Valve: There is moderate mitral annular calcification. There is   mild regurgitation.    Tricuspid Valve: There is moderate regurgitation.    Pulmonic Valve: There is mild regurgitation.    Pulmonary Artery:  There is pulmonary hypertension. The estimated   pulmonary artery systolic pressure is 75 mmHg.    IVC/SVC: Intermediate venous pressure at 8 mmHg.        Echo  Result Date: 10/10/2024    Left Ventricle: The left ventricle is normal in size. Normal wall   thickness. There is normal systolic function with a visually estimated   ejection fraction of 55 - 60%. Unable to assess diastolic function due to   atrial fibrillation.    Right Ventricle: Normal right ventricular cavity size. Wall thickness   is normal. Systolic function is normal.    Left Atrium: Left atrium is severely dilated.    Right Atrium: Right atrium is severely dilated.    Aortic Valve: The aortic valve is a trileaflet valve. There is moderate   aortic valve sclerosis. Mildly restricted motion. There is mild stenosis.   Aortic valve area by VTI is 2.6 cm2. Aortic valve peak velocity is 1.8   m/s. Mean gradient is 7.3 mmHg. The dimensionless index is 0.92.    Mitral Valve: There is mild regurgitation.    Tricuspid Valve: There is severe regurgitation.    Pulmonary Artery: The estimated pulmonary artery systolic pressure is   64 mmHg.    IVC/SVC: Elevated venous pressure at 15 mmHg.        Echo  Result Date: 4/25/2024    Left Ventricle: The left ventricle is normal in size. There is   concentric remodeling. There is normal systolic function with a visually   estimated ejection fraction of 55 - 60%. Biplane (2D) method of discs   ejection fraction is 59%. Unable to assess diastolic function due to   atrial fibrillation.    Right Ventricle: Normal right ventricular cavity size. Systolic   function is reduced.TAPSE is 1.05 cm.    Left Atrium: Left atrium is mildly dilated. The left atrium volume   index is 36.1 mL/m2.    Right Atrium: Right atrium is mildly dilated.    Aortic Valve: There is moderate aortic valve sclerosis. Mildly   restricted motion. There is mild to moderate stenosis. Aortic valve area   by VTI is 0.99 cm². Aortic valve area by velocity is  1.02 cm². Aortic   valve peak velocity is 1.66 m/s. Mean gradient is 7 mmHg. The   dimensionless index is 0.33.    Tricuspid Valve: There is moderate regurgitation.    Pulmonic Valve: There is mild regurgitation.    Pulmonary Artery: The estimated pulmonary artery systolic pressure is   53 mmHg.    IVC/SVC: Intermediate venous pressure at 8 mmHg.         Pulmonary hypertension  Given her history of stroke ideation more than 10 years ago due to her history of breast cancer, that can be the reason for her pulmonary hypertension  Echo was done  On Aldactone and IV Lasix  Strict intake and output, less than 1500 mL per day with low-salt diet  Cardiology is on board  Nonrheumatic tricuspid valve regurgitation  Echocardiogram with evidence of tricuspid regurgitation that is moderate . The patient's most recent echocardiogram result is listed below.     Echo  Result Date: 4/23/2025    Left Ventricle: There is low normal systolic function with a visually   estimated ejection fraction of 50 - 55%. Unable to assess diastolic   function due to atrial fibrillation.    Right Ventricle: The right ventricle is normal in size. Systolic   function is mildly reduced.    Left Atrium: dilated    Right Atrium: Right atrium is mildly dilated.    Aortic Valve: There is moderate aortic valve sclerosis. Moderately   restricted motion. There is moderate stenosis. Aortic valve area by VTI is   1.1 cm². Aortic valve peak velocity is 2.1 m/s. Mean gradient is 10 mmHg.   The dimensionless index is 0.35.    Mitral Valve: There is moderate mitral annular calcification. There is   mild regurgitation.    Tricuspid Valve: There is moderate regurgitation.    Pulmonic Valve: There is mild regurgitation.    Pulmonary Artery: There is pulmonary hypertension. The estimated   pulmonary artery systolic pressure is 75 mmHg.    IVC/SVC: Intermediate venous pressure at 8 mmHg.        Echo  Result Date: 10/10/2024    Left Ventricle: The left ventricle is normal  in size. Normal wall   thickness. There is normal systolic function with a visually estimated   ejection fraction of 55 - 60%. Unable to assess diastolic function due to   atrial fibrillation.    Right Ventricle: Normal right ventricular cavity size. Wall thickness   is normal. Systolic function is normal.    Left Atrium: Left atrium is severely dilated.    Right Atrium: Right atrium is severely dilated.    Aortic Valve: The aortic valve is a trileaflet valve. There is moderate   aortic valve sclerosis. Mildly restricted motion. There is mild stenosis.   Aortic valve area by VTI is 2.6 cm2. Aortic valve peak velocity is 1.8   m/s. Mean gradient is 7.3 mmHg. The dimensionless index is 0.92.    Mitral Valve: There is mild regurgitation.    Tricuspid Valve: There is severe regurgitation.    Pulmonary Artery: The estimated pulmonary artery systolic pressure is   64 mmHg.    IVC/SVC: Elevated venous pressure at 15 mmHg.        Echo  Result Date: 4/25/2024    Left Ventricle: The left ventricle is normal in size. There is   concentric remodeling. There is normal systolic function with a visually   estimated ejection fraction of 55 - 60%. Biplane (2D) method of discs   ejection fraction is 59%. Unable to assess diastolic function due to   atrial fibrillation.    Right Ventricle: Normal right ventricular cavity size. Systolic   function is reduced.TAPSE is 1.05 cm.    Left Atrium: Left atrium is mildly dilated. The left atrium volume   index is 36.1 mL/m2.    Right Atrium: Right atrium is mildly dilated.    Aortic Valve: There is moderate aortic valve sclerosis. Mildly   restricted motion. There is mild to moderate stenosis. Aortic valve area   by VTI is 0.99 cm². Aortic valve area by velocity is 1.02 cm². Aortic   valve peak velocity is 1.66 m/s. Mean gradient is 7 mmHg. The   dimensionless index is 0.33.    Tricuspid Valve: There is moderate regurgitation.    Pulmonic Valve: There is mild regurgitation.    Pulmonary  Artery: The estimated pulmonary artery systolic pressure is   53 mmHg.    IVC/SVC: Intermediate venous pressure at 8 mmHg.         VTE Risk Mitigation (From admission, onward)           Ordered     rivaroxaban tablet 15 mg  Daily         04/22/25 0749     IP VTE HIGH RISK PATIENT  Once         04/22/25 0133     Place sequential compression device  Until discontinued         04/22/25 0133                    Discharge Planning   BASILIO: 4/26/2025     Code Status: Full Code   Medical Readiness for Discharge Date:   Discharge Plan A: Home with family, Home Health                Please place Justification for DME        Eris Beyer MD  Department of Hospital Medicine   [place] - Telemetry     No

## 2025-08-05 ENCOUNTER — APPOINTMENT (OUTPATIENT)
Dept: OTOLARYNGOLOGY | Facility: CLINIC | Age: 75
End: 2025-08-05

## 2025-08-06 ENCOUNTER — APPOINTMENT (OUTPATIENT)
Dept: ORTHOPEDIC SURGERY | Facility: CLINIC | Age: 75
End: 2025-08-06
Payer: MEDICARE

## 2025-08-06 ENCOUNTER — NON-APPOINTMENT (OUTPATIENT)
Age: 75
End: 2025-08-06

## 2025-08-06 VITALS — BODY MASS INDEX: 26.51 KG/M2 | WEIGHT: 200 LBS | HEIGHT: 73 IN

## 2025-08-06 DIAGNOSIS — M17.11 UNILATERAL PRIMARY OSTEOARTHRITIS, RIGHT KNEE: ICD-10-CM

## 2025-08-06 PROCEDURE — 99214 OFFICE O/P EST MOD 30 MIN: CPT

## 2025-08-06 PROCEDURE — 73564 X-RAY EXAM KNEE 4 OR MORE: CPT | Mod: RT

## 2025-08-19 ENCOUNTER — OFFICE (OUTPATIENT)
Dept: URBAN - METROPOLITAN AREA CLINIC 70 | Facility: CLINIC | Age: 75
Setting detail: OPHTHALMOLOGY
End: 2025-08-19
Payer: MEDICARE

## 2025-08-19 DIAGNOSIS — H16.223: ICD-10-CM

## 2025-08-19 DIAGNOSIS — H26.493: ICD-10-CM

## 2025-08-19 DIAGNOSIS — H35.54: ICD-10-CM

## 2025-08-19 DIAGNOSIS — Z96.1: ICD-10-CM

## 2025-08-19 DIAGNOSIS — H11.042: ICD-10-CM

## 2025-08-19 DIAGNOSIS — H40.013: ICD-10-CM

## 2025-08-19 PROCEDURE — 92014 COMPRE OPH EXAM EST PT 1/>: CPT | Performed by: OPHTHALMOLOGY

## 2025-08-19 ASSESSMENT — VISUAL ACUITY
OS_BCVA: 20/30-2
OD_BCVA: 20/30-2

## 2025-08-19 ASSESSMENT — REFRACTION_MANIFEST
OD_VA1: 20/20
OD_AXIS: 095
OD_SPHERE: +1.50
OD_CYLINDER: -1.00
OS_SPHERE: +0.75
OS_VA1: 20/25+3
OS_AXIS: 070
OS_CYLINDER: -1.00

## 2025-08-19 ASSESSMENT — REFRACTION_CURRENTRX
OD_SPHERE: +1.25
OD_OVR_VA: 20/
OS_SPHERE: +1.25
OD_CYLINDER: SPH
OS_CYLINDER: SPH
OS_OVR_VA: 20/

## 2025-08-19 ASSESSMENT — SUPERFICIAL PUNCTATE KERATITIS (SPK)
OS_SPK: 1+
OD_SPK: 1+

## 2025-08-19 ASSESSMENT — REFRACTION_AUTOREFRACTION
OD_CYLINDER: -1.00
OS_AXIS: 078
OD_SPHERE: +1.00
OS_CYLINDER: -1.50
OS_SPHERE: +1.25
OD_AXIS: 073

## 2025-08-19 ASSESSMENT — KERATOMETRY
OS_K2POWER_DIOPTERS: 42.00
OD_K2POWER_DIOPTERS: 41.75
OD_K1POWER_DIOPTERS: 41.50
OS_AXISANGLE_DEGREES: 159
OD_AXISANGLE_DEGREES: 009
OS_K1POWER_DIOPTERS: 41.50

## 2025-08-19 ASSESSMENT — CONFRONTATIONAL VISUAL FIELD TEST (CVF)
OS_FINDINGS: FULL
OD_FINDINGS: FULL

## 2025-08-19 ASSESSMENT — CORNEAL PTERYGIUM: OS_PTERYGIUM: 1MM

## 2025-09-03 ENCOUNTER — APPOINTMENT (OUTPATIENT)
Dept: ORTHOPEDIC SURGERY | Facility: CLINIC | Age: 75
End: 2025-09-03
Payer: MEDICARE

## 2025-09-03 VITALS — WEIGHT: 200 LBS | HEIGHT: 73 IN | BODY MASS INDEX: 26.51 KG/M2

## 2025-09-03 DIAGNOSIS — M17.11 UNILATERAL PRIMARY OSTEOARTHRITIS, RIGHT KNEE: ICD-10-CM

## 2025-09-03 PROCEDURE — 99213 OFFICE O/P EST LOW 20 MIN: CPT | Mod: 25

## 2025-09-03 PROCEDURE — 20610 DRAIN/INJ JOINT/BURSA W/O US: CPT | Mod: RT

## 2025-09-16 ENCOUNTER — APPOINTMENT (OUTPATIENT)
Dept: OTOLARYNGOLOGY | Facility: CLINIC | Age: 75
End: 2025-09-16
Payer: MEDICARE

## 2025-09-16 VITALS
HEIGHT: 73 IN | WEIGHT: 200 LBS | BODY MASS INDEX: 26.51 KG/M2 | SYSTOLIC BLOOD PRESSURE: 141 MMHG | DIASTOLIC BLOOD PRESSURE: 84 MMHG

## 2025-09-16 DIAGNOSIS — Z80.6 FAMILY HISTORY OF LEUKEMIA: ICD-10-CM

## 2025-09-16 DIAGNOSIS — C32.0 MALIGNANT NEOPLASM OF GLOTTIS: ICD-10-CM

## 2025-09-16 PROCEDURE — 31575 DIAGNOSTIC LARYNGOSCOPY: CPT

## 2025-09-16 PROCEDURE — 99214 OFFICE O/P EST MOD 30 MIN: CPT | Mod: 25

## (undated) DEVICE — TUBING SET GRAVITY 4 SPIKE

## (undated) DEVICE — SHAVER BLADE ULTRAGATOR 3.5MM

## (undated) DEVICE — POSITIONER STRAP ARMBOARD VELCRO TS-30

## (undated) DEVICE — VENODYNE/SCD SLEEVE CALF MEDIUM

## (undated) DEVICE — PACK SHOULDER ARTHROSCOPY

## (undated) DEVICE — S&N ARTHROCARE WAND COBLATION WEREWOLF FLOW 90

## (undated) DEVICE — DRAPE 3/4 SHEET W REINFORCEMENT 56X77"

## (undated) DEVICE — GLV 7 PROTEXIS (BLUE)

## (undated) DEVICE — SLING SHOULDER IMMOBILIZER FOAM STRAPS XL 10 X 20"

## (undated) DEVICE — DRSG CURITY GAUZE SPONGE 4 X 4" 12-PLY

## (undated) DEVICE — WARMING BLANKET LOWER ADULT

## (undated) DEVICE — SOLIDIFIER CANN EXPRESS 3K

## (undated) DEVICE — DRAPE SPLIT SHEET 77" X 108"

## (undated) DEVICE — GLV 7 PROTEXIS (WHITE)

## (undated) DEVICE — CANNULA LINVATEC SHOULDER 7CM (GREY & ORANGE)

## (undated) DEVICE — DVC SUCTION FLR PUDDLE GUPPY

## (undated) DEVICE — POSITIONER POSITIONING ROLL  5X17"

## (undated) DEVICE — POSITIONER STIRRUP STRAP W SLIP RING 19X3.5"

## (undated) DEVICE — DRAPE TOP SHEET 53" X 101"

## (undated) DEVICE — SHAVER BLADE LINVATEC ULTRAFRR 3.5MM

## (undated) DEVICE — DRSG XEROFORM 5 X 9"

## (undated) DEVICE — BUR LINVATEC VORTEX 4.5 GREEN

## (undated) DEVICE — SUT MONOSOF 4-0 18" C-13

## (undated) DEVICE — BUR LINVATEC VORTEX ROUTER UNHOODED 4.5MM

## (undated) DEVICE — SOL IRR BAG NS 0.9% 3000ML